# Patient Record
Sex: FEMALE | Employment: UNEMPLOYED | URBAN - METROPOLITAN AREA
[De-identification: names, ages, dates, MRNs, and addresses within clinical notes are randomized per-mention and may not be internally consistent; named-entity substitution may affect disease eponyms.]

---

## 2018-04-05 LAB
AVERAGE GLUCOSE: NORMAL
HBA1C MFR BLD: 5 %

## 2019-08-20 LAB
AVERAGE GLUCOSE: NORMAL
HBA1C MFR BLD: 5 %

## 2020-10-21 RX ORDER — PROPRANOLOL HYDROCHLORIDE 10 MG/1
10 TABLET ORAL 3 TIMES DAILY
COMMUNITY

## 2020-10-21 RX ORDER — PREDNISONE 2.5 MG
2.5 TABLET ORAL DAILY
COMMUNITY

## 2020-10-21 RX ORDER — OMEPRAZOLE 20 MG/1
20 CAPSULE, DELAYED RELEASE ORAL DAILY
COMMUNITY

## 2020-10-21 RX ORDER — DULOXETIN HYDROCHLORIDE 60 MG/1
60 CAPSULE, DELAYED RELEASE ORAL DAILY
COMMUNITY

## 2020-10-21 RX ORDER — ZOLPIDEM TARTRATE 10 MG/1
TABLET ORAL NIGHTLY PRN
COMMUNITY

## 2020-10-21 RX ORDER — MELOXICAM 15 MG/1
15 TABLET ORAL DAILY
COMMUNITY

## 2022-01-01 ENCOUNTER — APPOINTMENT (OUTPATIENT)
Dept: CT IMAGING | Age: 67
DRG: 064 | End: 2022-01-01
Attending: INTERNAL MEDICINE
Payer: MEDICARE

## 2022-01-01 ENCOUNTER — HOSPITAL ENCOUNTER (INPATIENT)
Age: 67
LOS: 5 days | DRG: 064 | End: 2022-11-30
Attending: INTERNAL MEDICINE | Admitting: FAMILY MEDICINE
Payer: MEDICARE

## 2022-01-01 ENCOUNTER — APPOINTMENT (OUTPATIENT)
Dept: GENERAL RADIOLOGY | Age: 67
DRG: 064 | End: 2022-01-01
Attending: INTERNAL MEDICINE
Payer: MEDICARE

## 2022-01-01 VITALS
TEMPERATURE: 103.1 F | WEIGHT: 135.8 LBS | OXYGEN SATURATION: 72 % | BODY MASS INDEX: 24.06 KG/M2 | HEIGHT: 63 IN | HEART RATE: 118 BPM | RESPIRATION RATE: 24 BRPM | SYSTOLIC BLOOD PRESSURE: 94 MMHG | DIASTOLIC BLOOD PRESSURE: 39 MMHG

## 2022-01-01 DIAGNOSIS — S06.339S: Primary | ICD-10-CM

## 2022-01-01 LAB
AADO2: 83.8 MMHG
ACANTHOCYTES: ABNORMAL
ALBUMIN SERPL-MCNC: 3.8 G/DL (ref 3.5–5.2)
ALBUMIN SERPL-MCNC: 3.9 G/DL (ref 3.5–5.2)
ALP BLD-CCNC: 107 U/L (ref 35–104)
ALP BLD-CCNC: 109 U/L (ref 35–104)
ALT SERPL-CCNC: 18 U/L (ref 0–32)
ALT SERPL-CCNC: 19 U/L (ref 0–32)
ANGLE (CLOT STRENGTH): 76 DEGREE (ref 59–74)
ANION GAP SERPL CALCULATED.3IONS-SCNC: 13 MMOL/L (ref 7–16)
ANION GAP SERPL CALCULATED.3IONS-SCNC: 16 MMOL/L (ref 7–16)
ANISOCYTOSIS: ABNORMAL
AST SERPL-CCNC: 22 U/L (ref 0–31)
AST SERPL-CCNC: 25 U/L (ref 0–31)
B.E.: -0.9 MMOL/L (ref -3–3)
BASOPHILS ABSOLUTE: 0 E9/L (ref 0–0.2)
BASOPHILS RELATIVE PERCENT: 0.1 % (ref 0–2)
BILIRUB SERPL-MCNC: 0.4 MG/DL (ref 0–1.2)
BILIRUB SERPL-MCNC: 0.5 MG/DL (ref 0–1.2)
BUN BLDV-MCNC: 13 MG/DL (ref 6–23)
BUN BLDV-MCNC: 15 MG/DL (ref 6–23)
CALCIUM IONIZED: 1.17 MMOL/L (ref 1.15–1.33)
CALCIUM IONIZED: 1.18 MMOL/L (ref 1.15–1.33)
CALCIUM SERPL-MCNC: 8.4 MG/DL (ref 8.6–10.2)
CALCIUM SERPL-MCNC: 8.9 MG/DL (ref 8.6–10.2)
CHLORIDE BLD-SCNC: 102 MMOL/L (ref 98–107)
CHLORIDE BLD-SCNC: 103 MMOL/L (ref 98–107)
CO2: 18 MMOL/L (ref 22–29)
CO2: 22 MMOL/L (ref 22–29)
COHB: 0.6 % (ref 0–1.5)
CREAT SERPL-MCNC: 0.5 MG/DL (ref 0.5–1)
CREAT SERPL-MCNC: 0.5 MG/DL (ref 0.5–1)
CRITICAL: ABNORMAL
DATE ANALYZED: ABNORMAL
DATE OF COLLECTION: ABNORMAL
EOSINOPHILS ABSOLUTE: 0 E9/L (ref 0.05–0.5)
EOSINOPHILS RELATIVE PERCENT: 0 % (ref 0–6)
EPL-TEG: 0.6 % (ref 0–15)
FIO2: 35 %
G-TEG: 10.1 K D/SC (ref 4.5–11)
GFR SERPL CREATININE-BSD FRML MDRD: >60 ML/MIN/1.73
GFR SERPL CREATININE-BSD FRML MDRD: >60 ML/MIN/1.73
GLUCOSE BLD-MCNC: 162 MG/DL (ref 74–99)
GLUCOSE BLD-MCNC: 188 MG/DL (ref 74–99)
HBA1C MFR BLD: 4.8 % (ref 4–5.6)
HCO3: 20.2 MMOL/L (ref 22–26)
HCT VFR BLD CALC: 32.4 % (ref 34–48)
HCT VFR BLD CALC: 33.5 % (ref 34–48)
HEMOGLOBIN: 10.7 G/DL (ref 11.5–15.5)
HEMOGLOBIN: 10.9 G/DL (ref 11.5–15.5)
HHB: 1.3 % (ref 0–5)
HYPOCHROMIA: ABNORMAL
K (CLOTTING TIME): 1.1 MIN (ref 1–3)
LAB: ABNORMAL
LY30 (FIBRINOLYSIS): 0.6 % (ref 0–8)
LYMPHOCYTES ABSOLUTE: 0.97 E9/L (ref 1.5–4)
LYMPHOCYTES RELATIVE PERCENT: 6.9 % (ref 20–42)
Lab: ABNORMAL
MA (MAX AMPLITUDE): 66.9 MM (ref 50–70)
MAGNESIUM: 1.7 MG/DL (ref 1.6–2.6)
MAGNESIUM: 1.8 MG/DL (ref 1.6–2.6)
MCH RBC QN AUTO: 22.4 PG (ref 26–35)
MCH RBC QN AUTO: 22.6 PG (ref 26–35)
MCHC RBC AUTO-ENTMCNC: 32.5 % (ref 32–34.5)
MCHC RBC AUTO-ENTMCNC: 33 % (ref 32–34.5)
MCV RBC AUTO: 68.5 FL (ref 80–99.9)
MCV RBC AUTO: 68.9 FL (ref 80–99.9)
METHB: 0.4 % (ref 0–1.5)
MODE: AC
MONOCYTES ABSOLUTE: 0.97 E9/L (ref 0.1–0.95)
MONOCYTES RELATIVE PERCENT: 7 % (ref 2–12)
NEUTROPHILS ABSOLUTE: 11.95 E9/L (ref 1.8–7.3)
NEUTROPHILS RELATIVE PERCENT: 86.1 % (ref 43–80)
O2 SATURATION: 98.7 % (ref 92–98.5)
O2HB: 97.7 % (ref 94–97)
OPERATOR ID: 789
OSMOLALITY URINE: 434 MOSM/KG (ref 300–900)
OVALOCYTES: ABNORMAL
PATIENT TEMP: 37 C
PCO2: 23.8 MMHG (ref 35–45)
PDW BLD-RTO: 21.9 FL (ref 11.5–15)
PDW BLD-RTO: 22.3 FL (ref 11.5–15)
PEEP/CPAP: 5 CMH2O
PFO2: 3.69 MMHG/%
PH BLOOD GAS: 7.55 (ref 7.35–7.45)
PHOSPHORUS: 2.2 MG/DL (ref 2.5–4.5)
PHOSPHORUS: 2.4 MG/DL (ref 2.5–4.5)
PLATELET # BLD: 253 E9/L (ref 130–450)
PLATELET # BLD: 268 E9/L (ref 130–450)
PMV BLD AUTO: 8.6 FL (ref 7–12)
PMV BLD AUTO: 9.4 FL (ref 7–12)
PO2: 129.3 MMHG (ref 75–100)
POIKILOCYTES: ABNORMAL
POLYCHROMASIA: ABNORMAL
POTASSIUM SERPL-SCNC: 3.3 MMOL/L (ref 3.5–5)
POTASSIUM SERPL-SCNC: 3.6 MMOL/L (ref 3.5–5)
PROCALCITONIN: 0.61 NG/ML (ref 0–0.08)
R (REACTION TIME): 2.5 MIN (ref 5–10)
RBC # BLD: 4.73 E12/L (ref 3.5–5.5)
RBC # BLD: 4.86 E12/L (ref 3.5–5.5)
RI(T): 0.65
RR MECHANICAL: 14 B/MIN
SODIUM BLD-SCNC: 137 MMOL/L (ref 132–146)
SODIUM BLD-SCNC: 137 MMOL/L (ref 132–146)
SODIUM BLD-SCNC: 140 MMOL/L (ref 132–146)
SOURCE, BLOOD GAS: ABNORMAL
TARGET CELLS: ABNORMAL
TEAR DROP CELLS: ABNORMAL
THB: 11.5 G/DL (ref 11.5–16.5)
TIME ANALYZED: 416
TOTAL PROTEIN: 6.5 G/DL (ref 6.4–8.3)
TOTAL PROTEIN: 6.8 G/DL (ref 6.4–8.3)
VT MECHANICAL: 380 ML
WBC # BLD: 13.9 E9/L (ref 4.5–11.5)
WBC # BLD: 14.9 E9/L (ref 4.5–11.5)

## 2022-01-01 PROCEDURE — 2500000003 HC RX 250 WO HCPCS: Performed by: NURSE PRACTITIONER

## 2022-01-01 PROCEDURE — 6360000002 HC RX W HCPCS: Performed by: NURSE PRACTITIONER

## 2022-01-01 PROCEDURE — 02HV33Z INSERTION OF INFUSION DEVICE INTO SUPERIOR VENA CAVA, PERCUTANEOUS APPROACH: ICD-10-PCS | Performed by: STUDENT IN AN ORGANIZED HEALTH CARE EDUCATION/TRAINING PROGRAM

## 2022-01-01 PROCEDURE — 85025 COMPLETE CBC W/AUTO DIFF WBC: CPT

## 2022-01-01 PROCEDURE — 99222 1ST HOSP IP/OBS MODERATE 55: CPT | Performed by: PSYCHIATRY & NEUROLOGY

## 2022-01-01 PROCEDURE — 99222 1ST HOSP IP/OBS MODERATE 55: CPT | Performed by: NURSE PRACTITIONER

## 2022-01-01 PROCEDURE — 6360000004 HC RX CONTRAST MEDICATION: Performed by: RADIOLOGY

## 2022-01-01 PROCEDURE — 82330 ASSAY OF CALCIUM: CPT

## 2022-01-01 PROCEDURE — 94640 AIRWAY INHALATION TREATMENT: CPT

## 2022-01-01 PROCEDURE — 6370000000 HC RX 637 (ALT 250 FOR IP): Performed by: FAMILY MEDICINE

## 2022-01-01 PROCEDURE — 2580000003 HC RX 258: Performed by: STUDENT IN AN ORGANIZED HEALTH CARE EDUCATION/TRAINING PROGRAM

## 2022-01-01 PROCEDURE — 94003 VENT MGMT INPAT SUBQ DAY: CPT

## 2022-01-01 PROCEDURE — 2500000003 HC RX 250 WO HCPCS: Performed by: STUDENT IN AN ORGANIZED HEALTH CARE EDUCATION/TRAINING PROGRAM

## 2022-01-01 PROCEDURE — 80053 COMPREHEN METABOLIC PANEL: CPT

## 2022-01-01 PROCEDURE — 83735 ASSAY OF MAGNESIUM: CPT

## 2022-01-01 PROCEDURE — 1200000000 HC SEMI PRIVATE

## 2022-01-01 PROCEDURE — 2580000003 HC RX 258: Performed by: NURSE PRACTITIONER

## 2022-01-01 PROCEDURE — 5A1945Z RESPIRATORY VENTILATION, 24-96 CONSECUTIVE HOURS: ICD-10-PCS | Performed by: INTERNAL MEDICINE

## 2022-01-01 PROCEDURE — 71045 X-RAY EXAM CHEST 1 VIEW: CPT

## 2022-01-01 PROCEDURE — 37799 UNLISTED PX VASCULAR SURGERY: CPT

## 2022-01-01 PROCEDURE — 36556 INSERT NON-TUNNEL CV CATH: CPT

## 2022-01-01 PROCEDURE — 2000000000 HC ICU R&B

## 2022-01-01 PROCEDURE — 85576 BLOOD PLATELET AGGREGATION: CPT

## 2022-01-01 PROCEDURE — A4216 STERILE WATER/SALINE, 10 ML: HCPCS | Performed by: STUDENT IN AN ORGANIZED HEALTH CARE EDUCATION/TRAINING PROGRAM

## 2022-01-01 PROCEDURE — 6370000000 HC RX 637 (ALT 250 FOR IP): Performed by: NURSE PRACTITIONER

## 2022-01-01 PROCEDURE — 85027 COMPLETE CBC AUTOMATED: CPT

## 2022-01-01 PROCEDURE — 6360000002 HC RX W HCPCS: Performed by: STUDENT IN AN ORGANIZED HEALTH CARE EDUCATION/TRAINING PROGRAM

## 2022-01-01 PROCEDURE — 85384 FIBRINOGEN ACTIVITY: CPT

## 2022-01-01 PROCEDURE — 82805 BLOOD GASES W/O2 SATURATION: CPT

## 2022-01-01 PROCEDURE — 70496 CT ANGIOGRAPHY HEAD: CPT

## 2022-01-01 PROCEDURE — 70450 CT HEAD/BRAIN W/O DYE: CPT

## 2022-01-01 PROCEDURE — 83036 HEMOGLOBIN GLYCOSYLATED A1C: CPT

## 2022-01-01 PROCEDURE — 99233 SBSQ HOSP IP/OBS HIGH 50: CPT | Performed by: SURGERY

## 2022-01-01 PROCEDURE — 6360000002 HC RX W HCPCS

## 2022-01-01 PROCEDURE — 36592 COLLECT BLOOD FROM PICC: CPT

## 2022-01-01 PROCEDURE — 84295 ASSAY OF SERUM SODIUM: CPT

## 2022-01-01 PROCEDURE — 36620 INSERTION CATHETER ARTERY: CPT

## 2022-01-01 PROCEDURE — 85347 COAGULATION TIME ACTIVATED: CPT

## 2022-01-01 PROCEDURE — 99232 SBSQ HOSP IP/OBS MODERATE 35: CPT | Performed by: NURSE PRACTITIONER

## 2022-01-01 PROCEDURE — 94002 VENT MGMT INPAT INIT DAY: CPT

## 2022-01-01 PROCEDURE — 36415 COLL VENOUS BLD VENIPUNCTURE: CPT

## 2022-01-01 PROCEDURE — 6370000000 HC RX 637 (ALT 250 FOR IP): Performed by: STUDENT IN AN ORGANIZED HEALTH CARE EDUCATION/TRAINING PROGRAM

## 2022-01-01 PROCEDURE — 83935 ASSAY OF URINE OSMOLALITY: CPT

## 2022-01-01 PROCEDURE — 6360000002 HC RX W HCPCS: Performed by: FAMILY MEDICINE

## 2022-01-01 PROCEDURE — 84145 PROCALCITONIN (PCT): CPT

## 2022-01-01 PROCEDURE — 84100 ASSAY OF PHOSPHORUS: CPT

## 2022-01-01 RX ORDER — MINERAL OIL AND WHITE PETROLATUM 150; 830 MG/G; MG/G
OINTMENT OPHTHALMIC EVERY 4 HOURS
Status: DISCONTINUED | OUTPATIENT
Start: 2022-01-01 | End: 2022-01-01

## 2022-01-01 RX ORDER — MIDAZOLAM HYDROCHLORIDE 2 MG/2ML
1 INJECTION, SOLUTION INTRAMUSCULAR; INTRAVENOUS ONCE
Status: COMPLETED | OUTPATIENT
Start: 2022-01-01 | End: 2022-01-01

## 2022-01-01 RX ORDER — ONDANSETRON 2 MG/ML
4 INJECTION INTRAMUSCULAR; INTRAVENOUS EVERY 6 HOURS PRN
Status: DISCONTINUED | OUTPATIENT
Start: 2022-01-01 | End: 2022-01-01 | Stop reason: HOSPADM

## 2022-01-01 RX ORDER — HYDRALAZINE HYDROCHLORIDE 20 MG/ML
10 INJECTION INTRAMUSCULAR; INTRAVENOUS EVERY 30 MIN PRN
Status: DISCONTINUED | OUTPATIENT
Start: 2022-01-01 | End: 2022-01-01

## 2022-01-01 RX ORDER — MIDAZOLAM HYDROCHLORIDE 2 MG/2ML
2 INJECTION, SOLUTION INTRAMUSCULAR; INTRAVENOUS
Status: DISCONTINUED | OUTPATIENT
Start: 2022-01-01 | End: 2022-01-01 | Stop reason: HOSPADM

## 2022-01-01 RX ORDER — LABETALOL HYDROCHLORIDE 5 MG/ML
10 INJECTION, SOLUTION INTRAVENOUS
Status: DISCONTINUED | OUTPATIENT
Start: 2022-01-01 | End: 2022-01-01

## 2022-01-01 RX ORDER — SODIUM CHLORIDE 0.9 % (FLUSH) 0.9 %
5-40 SYRINGE (ML) INJECTION 2 TIMES DAILY
Status: DISCONTINUED | OUTPATIENT
Start: 2022-01-01 | End: 2022-01-01

## 2022-01-01 RX ORDER — MAGNESIUM SULFATE IN WATER 40 MG/ML
2000 INJECTION, SOLUTION INTRAVENOUS ONCE
Status: COMPLETED | OUTPATIENT
Start: 2022-01-01 | End: 2022-01-01

## 2022-01-01 RX ORDER — IPRATROPIUM BROMIDE AND ALBUTEROL SULFATE 2.5; .5 MG/3ML; MG/3ML
1 SOLUTION RESPIRATORY (INHALATION)
Status: DISCONTINUED | OUTPATIENT
Start: 2022-01-01 | End: 2022-01-01

## 2022-01-01 RX ORDER — MORPHINE SULFATE 2 MG/ML
2 INJECTION, SOLUTION INTRAMUSCULAR; INTRAVENOUS
Status: DISCONTINUED | OUTPATIENT
Start: 2022-01-01 | End: 2022-01-01 | Stop reason: HOSPADM

## 2022-01-01 RX ORDER — LORAZEPAM 2 MG/ML
0.5 INJECTION INTRAMUSCULAR
Status: DISCONTINUED | OUTPATIENT
Start: 2022-01-01 | End: 2022-01-01 | Stop reason: HOSPADM

## 2022-01-01 RX ORDER — PROPRANOLOL HYDROCHLORIDE 10 MG/1
10 TABLET ORAL 3 TIMES DAILY
Status: DISCONTINUED | OUTPATIENT
Start: 2022-01-01 | End: 2022-01-01

## 2022-01-01 RX ORDER — MIDAZOLAM HYDROCHLORIDE 1 MG/ML
INJECTION INTRAMUSCULAR; INTRAVENOUS
Status: COMPLETED
Start: 2022-01-01 | End: 2022-01-01

## 2022-01-01 RX ORDER — ONDANSETRON 4 MG/1
4 TABLET, ORALLY DISINTEGRATING ORAL EVERY 8 HOURS PRN
Status: DISCONTINUED | OUTPATIENT
Start: 2022-01-01 | End: 2022-01-01 | Stop reason: HOSPADM

## 2022-01-01 RX ORDER — LEVETIRACETAM 10 MG/ML
1000 INJECTION INTRAVASCULAR ONCE
Status: DISCONTINUED | OUTPATIENT
Start: 2022-01-01 | End: 2022-01-01

## 2022-01-01 RX ORDER — AMLODIPINE BESYLATE 5 MG/1
5 TABLET ORAL DAILY
Status: DISCONTINUED | OUTPATIENT
Start: 2022-01-01 | End: 2022-01-01

## 2022-01-01 RX ORDER — SODIUM CHLORIDE 0.9 % (FLUSH) 0.9 %
5-40 SYRINGE (ML) INJECTION PRN
Status: DISCONTINUED | OUTPATIENT
Start: 2022-01-01 | End: 2022-01-01 | Stop reason: HOSPADM

## 2022-01-01 RX ORDER — CHLORHEXIDINE GLUCONATE 0.12 MG/ML
15 RINSE ORAL 2 TIMES DAILY
Status: DISCONTINUED | OUTPATIENT
Start: 2022-01-01 | End: 2022-01-01

## 2022-01-01 RX ORDER — MORPHINE SULFATE 4 MG/ML
4 INJECTION, SOLUTION INTRAMUSCULAR; INTRAVENOUS
Status: DISCONTINUED | OUTPATIENT
Start: 2022-01-01 | End: 2022-01-01 | Stop reason: HOSPADM

## 2022-01-01 RX ORDER — ACETAMINOPHEN 325 MG/1
650 TABLET ORAL EVERY 4 HOURS PRN
Status: DISCONTINUED | OUTPATIENT
Start: 2022-01-01 | End: 2022-01-01

## 2022-01-01 RX ORDER — POTASSIUM BICARBONATE 25 MEQ/1
25 TABLET, EFFERVESCENT ORAL ONCE
Status: DISCONTINUED | OUTPATIENT
Start: 2022-01-01 | End: 2022-01-01

## 2022-01-01 RX ORDER — 3% SODIUM CHLORIDE 3 G/100ML
25 INJECTION, SOLUTION INTRAVENOUS CONTINUOUS
Status: DISCONTINUED | OUTPATIENT
Start: 2022-01-01 | End: 2022-01-01

## 2022-01-01 RX ORDER — GLYCOPYRROLATE 0.2 MG/ML
0.2 INJECTION INTRAMUSCULAR; INTRAVENOUS EVERY 4 HOURS PRN
Status: DISCONTINUED | OUTPATIENT
Start: 2022-01-01 | End: 2022-01-01 | Stop reason: HOSPADM

## 2022-01-01 RX ORDER — PROPOFOL 10 MG/ML
5-50 INJECTION, EMULSION INTRAVENOUS CONTINUOUS
Status: DISCONTINUED | OUTPATIENT
Start: 2022-01-01 | End: 2022-01-01

## 2022-01-01 RX ORDER — DULOXETIN HYDROCHLORIDE 60 MG/1
60 CAPSULE, DELAYED RELEASE ORAL DAILY
Status: DISCONTINUED | OUTPATIENT
Start: 2022-01-01 | End: 2022-01-01

## 2022-01-01 RX ORDER — SODIUM CHLORIDE 0.9 % (FLUSH) 0.9 %
10 SYRINGE (ML) INJECTION ONCE
Status: DISCONTINUED | OUTPATIENT
Start: 2022-01-01 | End: 2022-01-01

## 2022-01-01 RX ORDER — POLYETHYLENE GLYCOL 3350 17 G/17G
17 POWDER, FOR SOLUTION ORAL DAILY
Status: DISCONTINUED | OUTPATIENT
Start: 2022-01-01 | End: 2022-01-01

## 2022-01-01 RX ORDER — POLYVINYL ALCOHOL 14 MG/ML
1 SOLUTION/ DROPS OPHTHALMIC EVERY 4 HOURS
Status: DISCONTINUED | OUTPATIENT
Start: 2022-01-01 | End: 2022-01-01

## 2022-01-01 RX ORDER — LABETALOL HYDROCHLORIDE 5 MG/ML
10 INJECTION, SOLUTION INTRAVENOUS EVERY 10 MIN PRN
Status: DISCONTINUED | OUTPATIENT
Start: 2022-01-01 | End: 2022-01-01

## 2022-01-01 RX ORDER — LEVETIRACETAM 5 MG/ML
500 INJECTION INTRAVASCULAR EVERY 12 HOURS
Status: DISCONTINUED | OUTPATIENT
Start: 2022-01-01 | End: 2022-01-01

## 2022-01-01 RX ADMIN — LORAZEPAM 0.5 MG: 2 INJECTION INTRAMUSCULAR; INTRAVENOUS at 12:17

## 2022-01-01 RX ADMIN — POLYVINYL ALCOHOL 1 DROP: 14 SOLUTION/ DROPS OPHTHALMIC at 01:22

## 2022-01-01 RX ADMIN — MORPHINE SULFATE 4 MG: 4 INJECTION, SOLUTION INTRAMUSCULAR; INTRAVENOUS at 17:30

## 2022-01-01 RX ADMIN — GLYCOPYRROLATE 0.2 MG: 0.2 INJECTION, SOLUTION INTRAMUSCULAR; INTRAVENOUS at 10:34

## 2022-01-01 RX ADMIN — CHLORHEXIDINE GLUCONATE 15 ML: 1.2 RINSE BUCCAL at 01:23

## 2022-01-01 RX ADMIN — FAMOTIDINE 20 MG: 10 INJECTION, SOLUTION INTRAVENOUS at 08:21

## 2022-01-01 RX ADMIN — MINERAL OIL, WHITE PETROLATUM: .03; .94 OINTMENT OPHTHALMIC at 01:26

## 2022-01-01 RX ADMIN — IPRATROPIUM BROMIDE AND ALBUTEROL SULFATE 1 AMPULE: 2.5; .5 SOLUTION RESPIRATORY (INHALATION) at 12:22

## 2022-01-01 RX ADMIN — DULOXETINE HYDROCHLORIDE 60 MG: 60 CAPSULE, DELAYED RELEASE ORAL at 08:18

## 2022-01-01 RX ADMIN — MIDAZOLAM HYDROCHLORIDE 1 MG: 2 INJECTION, SOLUTION INTRAMUSCULAR; INTRAVENOUS at 01:26

## 2022-01-01 RX ADMIN — MIDAZOLAM 2 MG: 1 INJECTION INTRAMUSCULAR; INTRAVENOUS at 20:30

## 2022-01-01 RX ADMIN — MORPHINE SULFATE 4 MG: 4 INJECTION, SOLUTION INTRAMUSCULAR; INTRAVENOUS at 14:29

## 2022-01-01 RX ADMIN — MIDAZOLAM 1 MG: 1 INJECTION INTRAMUSCULAR; INTRAVENOUS at 01:26

## 2022-01-01 RX ADMIN — LORAZEPAM 0.5 MG: 2 INJECTION INTRAMUSCULAR; INTRAVENOUS at 16:28

## 2022-01-01 RX ADMIN — POTASSIUM BICARBONATE 40 MEQ: 782 TABLET, EFFERVESCENT ORAL at 08:13

## 2022-01-01 RX ADMIN — LORAZEPAM 0.5 MG: 2 INJECTION INTRAMUSCULAR; INTRAVENOUS at 14:26

## 2022-01-01 RX ADMIN — ACETAMINOPHEN 650 MG: 325 TABLET ORAL at 12:50

## 2022-01-01 RX ADMIN — POLYVINYL ALCOHOL 1 DROP: 14 SOLUTION/ DROPS OPHTHALMIC at 05:22

## 2022-01-01 RX ADMIN — SODIUM CHLORIDE, PRESERVATIVE FREE 10 ML: 5 INJECTION INTRAVENOUS at 14:03

## 2022-01-01 RX ADMIN — POLYVINYL ALCOHOL 1 DROP: 14 SOLUTION/ DROPS OPHTHALMIC at 08:30

## 2022-01-01 RX ADMIN — MORPHINE SULFATE 4 MG: 4 INJECTION, SOLUTION INTRAMUSCULAR; INTRAVENOUS at 16:43

## 2022-01-01 RX ADMIN — CHLORHEXIDINE GLUCONATE 15 ML: 1.2 RINSE BUCCAL at 08:10

## 2022-01-01 RX ADMIN — MIDAZOLAM 2 MG: 1 INJECTION INTRAMUSCULAR; INTRAVENOUS at 14:03

## 2022-01-01 RX ADMIN — SODIUM CHLORIDE 7.5 MG/HR: 9 INJECTION, SOLUTION INTRAVENOUS at 22:04

## 2022-01-01 RX ADMIN — MORPHINE SULFATE 9 MG/HR: 10 INJECTION INTRAVENOUS at 05:36

## 2022-01-01 RX ADMIN — AMPICILLIN SODIUM AND SULBACTAM SODIUM 3000 MG: 2; 1 INJECTION, POWDER, FOR SOLUTION INTRAMUSCULAR; INTRAVENOUS at 06:37

## 2022-01-01 RX ADMIN — ACETAMINOPHEN 650 MG: 325 TABLET ORAL at 08:14

## 2022-01-01 RX ADMIN — POLYETHYLENE GLYCOL 3350 17 G: 17 POWDER, FOR SOLUTION ORAL at 08:14

## 2022-01-01 RX ADMIN — PROPOFOL 40 MCG/KG/MIN: 10 INJECTION, EMULSION INTRAVENOUS at 22:02

## 2022-01-01 RX ADMIN — MORPHINE SULFATE 1 MG/HR: 10 INJECTION INTRAVENOUS at 15:29

## 2022-01-01 RX ADMIN — AMLODIPINE BESYLATE 5 MG: 5 TABLET ORAL at 08:18

## 2022-01-01 RX ADMIN — MORPHINE SULFATE 6 MG/HR: 10 INJECTION INTRAVENOUS at 12:25

## 2022-01-01 RX ADMIN — MINERAL OIL, WHITE PETROLATUM: .03; .94 OINTMENT OPHTHALMIC at 09:59

## 2022-01-01 RX ADMIN — IPRATROPIUM BROMIDE AND ALBUTEROL SULFATE 1 AMPULE: 2.5; .5 SOLUTION RESPIRATORY (INHALATION) at 08:13

## 2022-01-01 RX ADMIN — MIDAZOLAM 2 MG: 1 INJECTION INTRAMUSCULAR; INTRAVENOUS at 11:24

## 2022-01-01 RX ADMIN — MORPHINE SULFATE 4 MG: 4 INJECTION, SOLUTION INTRAMUSCULAR; INTRAVENOUS at 15:15

## 2022-01-01 RX ADMIN — PROPRANOLOL HYDROCHLORIDE 10 MG: 10 TABLET ORAL at 12:51

## 2022-01-01 RX ADMIN — MORPHINE SULFATE 8 MG/HR: 10 INJECTION INTRAVENOUS at 02:38

## 2022-01-01 RX ADMIN — LORAZEPAM 0.5 MG: 2 INJECTION INTRAMUSCULAR; INTRAVENOUS at 22:40

## 2022-01-01 RX ADMIN — SODIUM CHLORIDE 25 ML/HR: 3 INJECTION, SOLUTION INTRAVENOUS at 03:42

## 2022-01-01 RX ADMIN — PROPRANOLOL HYDROCHLORIDE 10 MG: 10 TABLET ORAL at 08:19

## 2022-01-01 RX ADMIN — ONDANSETRON 4 MG: 2 INJECTION INTRAMUSCULAR; INTRAVENOUS at 23:11

## 2022-01-01 RX ADMIN — POLYVINYL ALCOHOL 1 DROP: 14 SOLUTION/ DROPS OPHTHALMIC at 11:31

## 2022-01-01 RX ADMIN — MINERAL OIL, WHITE PETROLATUM: .03; .94 OINTMENT OPHTHALMIC at 06:37

## 2022-01-01 RX ADMIN — LEVETIRACETAM 500 MG: 5 INJECTION INTRAVENOUS at 08:25

## 2022-01-01 RX ADMIN — AMPICILLIN SODIUM AND SULBACTAM SODIUM 3000 MG: 2; 1 INJECTION, POWDER, FOR SOLUTION INTRAMUSCULAR; INTRAVENOUS at 01:26

## 2022-01-01 RX ADMIN — SODIUM CHLORIDE, PRESERVATIVE FREE 10 ML: 5 INJECTION INTRAVENOUS at 16:44

## 2022-01-01 RX ADMIN — GLYCOPYRROLATE 0.2 MG: 0.2 INJECTION, SOLUTION INTRAMUSCULAR; INTRAVENOUS at 15:16

## 2022-01-01 RX ADMIN — MORPHINE SULFATE 9 MG/HR: 10 INJECTION INTRAVENOUS at 14:37

## 2022-01-01 RX ADMIN — SODIUM CHLORIDE 11 MG/HR: 9 INJECTION, SOLUTION INTRAVENOUS at 05:14

## 2022-01-01 RX ADMIN — SODIUM CHLORIDE, PRESERVATIVE FREE 10 ML: 5 INJECTION INTRAVENOUS at 15:16

## 2022-01-01 RX ADMIN — MAGNESIUM SULFATE HEPTAHYDRATE 2000 MG: 40 INJECTION, SOLUTION INTRAVENOUS at 08:33

## 2022-01-01 RX ADMIN — SODIUM CHLORIDE, PRESERVATIVE FREE 10 ML: 5 INJECTION INTRAVENOUS at 11:24

## 2022-01-01 RX ADMIN — GLYCOPYRROLATE 0.2 MG: 0.2 INJECTION, SOLUTION INTRAMUSCULAR; INTRAVENOUS at 14:24

## 2022-01-01 RX ADMIN — HYDRALAZINE HYDROCHLORIDE 10 MG: 20 INJECTION INTRAMUSCULAR; INTRAVENOUS at 03:04

## 2022-01-01 RX ADMIN — LORAZEPAM 0.5 MG: 2 INJECTION INTRAMUSCULAR; INTRAVENOUS at 17:30

## 2022-01-01 RX ADMIN — SODIUM CHLORIDE 10 MG/HR: 9 INJECTION, SOLUTION INTRAVENOUS at 10:21

## 2022-01-01 RX ADMIN — SODIUM CHLORIDE, PRESERVATIVE FREE 10 ML: 5 INJECTION INTRAVENOUS at 17:30

## 2022-01-01 RX ADMIN — GLYCOPYRROLATE 0.2 MG: 0.2 INJECTION, SOLUTION INTRAMUSCULAR; INTRAVENOUS at 05:47

## 2022-01-01 RX ADMIN — MIDAZOLAM 2 MG: 1 INJECTION INTRAMUSCULAR; INTRAVENOUS at 05:47

## 2022-01-01 RX ADMIN — GLYCOPYRROLATE 0.2 MG: 0.2 INJECTION, SOLUTION INTRAMUSCULAR; INTRAVENOUS at 20:30

## 2022-01-01 RX ADMIN — LORAZEPAM 0.5 MG: 2 INJECTION INTRAMUSCULAR; INTRAVENOUS at 12:28

## 2022-01-01 RX ADMIN — LORAZEPAM 0.5 MG: 2 INJECTION INTRAMUSCULAR; INTRAVENOUS at 15:16

## 2022-01-01 RX ADMIN — MORPHINE SULFATE 8 MG/HR: 10 INJECTION INTRAVENOUS at 17:53

## 2022-01-01 RX ADMIN — AMPICILLIN SODIUM AND SULBACTAM SODIUM 3000 MG: 2; 1 INJECTION, POWDER, FOR SOLUTION INTRAMUSCULAR; INTRAVENOUS at 11:27

## 2022-01-01 RX ADMIN — IOPAMIDOL 70 ML: 755 INJECTION, SOLUTION INTRAVENOUS at 06:13

## 2022-01-01 RX ADMIN — PROPRANOLOL HYDROCHLORIDE 10 MG: 10 TABLET ORAL at 01:27

## 2022-01-01 RX ADMIN — FAMOTIDINE 20 MG: 10 INJECTION, SOLUTION INTRAVENOUS at 01:23

## 2022-01-01 RX ADMIN — POTASSIUM PHOSPHATE, MONOBASIC AND POTASSIUM PHOSPHATE, DIBASIC 30 MMOL: 224; 236 INJECTION, SOLUTION, CONCENTRATE INTRAVENOUS at 08:35

## 2022-01-01 RX ADMIN — SODIUM CHLORIDE, PRESERVATIVE FREE 10 ML: 5 INJECTION INTRAVENOUS at 08:19

## 2022-01-01 ASSESSMENT — PULMONARY FUNCTION TESTS
PIF_VALUE: 18
PIF_VALUE: 16
PIF_VALUE: 18
PIF_VALUE: 19
PIF_VALUE: 28
PIF_VALUE: 20
PIF_VALUE: 20
PIF_VALUE: 18
PIF_VALUE: 21
PIF_VALUE: 17
PIF_VALUE: 20
PIF_VALUE: 19
PIF_VALUE: 19
PIF_VALUE: 17
PIF_VALUE: 16
PIF_VALUE: 16
PIF_VALUE: 18
PIF_VALUE: 20
PIF_VALUE: 19
PIF_VALUE: 20
PIF_VALUE: 20
PIF_VALUE: 19
PIF_VALUE: 23
PIF_VALUE: 18
PIF_VALUE: 19
PIF_VALUE: 19
PIF_VALUE: 40
PIF_VALUE: 19
PIF_VALUE: 18
PIF_VALUE: 23
PIF_VALUE: 20
PIF_VALUE: 19
PIF_VALUE: 20
PIF_VALUE: 19
PIF_VALUE: 21
PIF_VALUE: 18
PIF_VALUE: 18
PIF_VALUE: 19
PIF_VALUE: 20
PIF_VALUE: 15
PIF_VALUE: 18
PIF_VALUE: 19

## 2022-01-01 ASSESSMENT — PAIN SCALES - GENERAL
PAINLEVEL_OUTOF10: 1
PAINLEVEL_OUTOF10: 0
PAINLEVEL_OUTOF10: 1
PAINLEVEL_OUTOF10: 0
PAINLEVEL_OUTOF10: 1
PAINLEVEL_OUTOF10: 0
PAINLEVEL_OUTOF10: 1

## 2022-11-25 PROBLEM — S06.339S: Status: ACTIVE | Noted: 2022-01-01

## 2022-11-26 PROBLEM — I62.9 INTRACRANIAL HEMORRHAGE (HCC): Status: ACTIVE | Noted: 2022-01-01

## 2022-11-26 NOTE — PROGRESS NOTES
[facility]  SURGICAL INTENSIVE CARE UNIT (SICU)  ATTENDING PHYSICIAN CRITICAL CARE PROGRESS NOTE     I have examined the patient, reviewed the record,and discussed the case with the APN/  Resident. I have reviewed all relevant labs and imaging data. The following summarizes my clinical findings and independent assessment. Date of admission:  11/25/2022    CC: [facility]     HOSPITAL COURSE:   Noemy Dean is a 69year old female who was found down in her yard this afternoon. Last known well around 1 pm. She presented to [facility] where she underwent CT head which revealed 5 x 7 cm left parietal intraparenchymal measuring 5 x 7 cm with surrounding edema and 8 mm midline shift. There was also a 3-5 mm SDH. The disk with the scan was sent over from [place], but the scans were unable to be viewed. Patient has unequal pupils- right 5 mm and left 3 mm. She is moving her left side spontaneously with minimal movement of her right side. Patient is not on any anticoagulation. New Imaging Reviewed and personally interpreted:    CT head reviewed left parenchymal hemorrhage within the left temporal lobe with 6 mm rightward shift     New Labs reviewed sodium 140, potassium 3.3, creatinine 0.5, glucose 188, Phos 2.2, LFTs essentially normal, white blood cell count 14.9, hemoglobin 10.7, platelet count 202    Vent Settings AC vent respiratory rate 14 tidal volume 380 PEEP 5    Physical Exam  HENT:      Head: Normocephalic. Eyes:      Pupils: Pupils are equal, round, and reactive to light. Cardiovascular:      Rate and Rhythm: Normal rate. Pulmonary:      Effort: Pulmonary effort is normal.   Abdominal:      General: Abdomen is flat. There is no distension. Musculoskeletal:      Cervical back: Neck supple. Skin:     General: Skin is warm.    Neurological:      Comments: Minimal withdrawal on the right purposeful on the left does not follow commands       Assessment   Principal Problem: Intraparenchymal hematoma of brain, unspecified laterality, with loc of unspecified duration, sequela ([address]  Active Problems:    Intracranial hemorrhage ([address]  Resolved Problems:    * No resolved hospital problems.  *      Plan     Code status changed DNR-CC   Comfort measures to be placed     Spring Carrero MD

## 2022-11-26 NOTE — PROGRESS NOTES
11/26/22 1355   NICU Vent Information   Vent Mode (S)  AC/VC   Vt (Set, mL) 380 mL   Vt (Measured) 389 mL   Resp Rate (Set) 16 bmp   Rate Measured 15 br/min   Minute Volume (L/min) 5.42 Liters   Peak Flow 60 L/min   FiO2  35 %   Peak Inspiratory Pressure (cmH2O) 28 cmH2O   I:E Ratio 1:4.40   Sensitivity 3   PEEP/CPAP (cmH2O) 5   Mean Airway Pressure (cmH2O) 7 cmH20   Additional Respiratory Assessments   Heart Rate 65   Resp (!) 31   SpO2 100 %   Vent Alarm Settings   High Pressure (cmH2O) 40 cmH2O   Low Exhaled Vt (ml) 0 mL   Patient placed back on AC settings at this time

## 2022-11-26 NOTE — PROCEDURES
Vonda Mcgee is a 69 y.o. female patient. No diagnosis found. No past medical history on file. Blood pressure (!) 149/68, pulse 72, temperature 98.1 °F (36.7 °C), temperature source Temporal, resp. rate 18, height 5' 3\" (1.6 m), weight 135 lb 12.8 oz (61.6 kg), SpO2 100 %. Central Line    Date/Time: 11/26/2022 1:16 AM  Performed by: Kacie Cabrera MD  Authorized by: Kacie Cabrera MD   Consent: Verbal consent obtained.   Risks and benefits: risks, benefits and alternatives were discussed  Consent given by: power of   Procedure consent: procedure consent matches procedure scheduled  Relevant documents: relevant documents present and verified  Test results: test results available and properly labeled  Site marked: the operative site was marked  Imaging studies: imaging studies available  Required items: required blood products, implants, devices, and special equipment available  Patient identity confirmed: anonymous protocol, patient vented/unresponsive  Indications: vascular access  Anesthesia: see MAR for details    Anesthesia:  Local Anesthetic: lidocaine 1% with epinephrine  Preparation: skin prepped with 2% chlorhexidine  Skin prep agent dried: skin prep agent completely dried prior to procedure  Sterile barriers: all five maximum sterile barriers used - cap, mask, sterile gown, sterile gloves, and large sterile sheet  Hand hygiene: hand hygiene performed prior to central venous catheter insertion  Location details: right internal jugular  Patient position: flat  Catheter type: triple lumen  Catheter size: 7 Fr  Pre-procedure: landmarks identified  Ultrasound guidance: yes  Sterile ultrasound techniques: sterile gel and sterile probe covers were used  Number of attempts: 1  Successful placement: yes  Post-procedure: line sutured and dressing applied  Assessment: blood return through all ports and free fluid flow  Patient tolerance: patient tolerated the procedure well with no immediate complications    Post-operative CXR ordered. Dr. Phi Meza was readily available throughout the entire procedure.      Jewel Wesley MD  11/26/2022

## 2022-11-26 NOTE — H&P
Hospital Medicine History & Physical      PCP: No primary care provider on file. Date of Admission: 11/25/2022    Date of Service: Pt seen/examined on 11/25/2022 and Admitted to Inpatient with expected LOS greater than two midnights due to medical therapy. Chief Complaint:  AMS       History Of Present Illness:    69 y.o. female with past medical history of hypertension GERD PMR  . Patient is a transfer from [place] . Patient is intubated and sedated . She was found down in her yard by her neighbors . It is unclear how long she was down for . Jaja Berman Patient was seen 5-6 days ago due to headache. and CT head and CT angio were negative Son states that he last spoke to patient at 1300 and she was well . CT revealed  5x7 intraparenchymal hematoma in left middle parietal lobe with edema 8mm  midline shift  and large subdural hematoma 3-5 m in thickness . Patient was intubated and pressures were noted to be elevated and she was placed on nicardipine drip . Patient is not on anticoagulation  ECG revealed sinus bradycardia . CBC unremarkable  CMP revealed sodium 133  BUN 23 SCr  0.7 glucose 153  VBG revealed respiratory alkalosis  CXR was negative . Patient was given Keppra as well,. Patient was transferred to [address] [place] [address] for further evaluation     Past Medical History:      Hypertension     Past Surgical History:          Procedure Laterality Date    BREAST BIOPSY Right     negative    HERNIA REPAIR Right     groin    LUMBAR LAMINECTOMY         Medications Prior to Admission:      Prior to Admission medications    Medication Sig Start Date End Date Taking?  Authorizing Provider   Coral Calcium-Magnesium-Vit D [phone] MG-MG-UNIT CAPS Take by mouth    Historical Provider, MD   VITAMIN D PO Take 1,000 Units by mouth    Historical Provider, MD   omeprazole (PRILOSEC) 20 MG delayed release capsule Take 20 mg by mouth daily    Historical Provider, MD   propranolol (INDERAL) 10 MG tablet Take 10 mg by mouth 3 times daily    Historical Provider, MD   zolpidem (AMBIEN) 10 MG tablet Take by mouth nightly as needed for Sleep. Historical Provider, MD   predniSONE (DELTASONE) 2.5 MG tablet Take 2.5 mg by mouth daily    Historical Provider, MD   meloxicam (MOBIC) 15 MG tablet Take 15 mg by mouth daily    Historical Provider, MD   DULoxetine (CYMBALTA) 60 MG extended release capsule Take 60 mg by mouth daily    Historical Provider, MD       Allergies:  Zithromax [azithromycin]    Social History:      The patient currently lives w3ith family     TOBACCO:   reports that she has never smoked. She does not have any smokeless tobacco history on file. ETOH:   has no history on file for alcohol use. Family History:       Reviewed in detail and negative for DM, CAD, Cancer, CVA. Positive as follows:        Problem Relation Age of Onset    Breast Cancer Mother     Other Son         thalassemia       REVIEW OF SYSTEMS:   unable to obtain patient is intubated and sedated . PHYSICAL EXAM:    /66   Pulse 78   Temp 98.1 °F (36.7 °C) (Temporal)   Resp 14   Ht 5' 3\" (1.6 m)   Wt 135 lb 12.8 oz (61.6 kg)   SpO2 100%   BMI 24.06 kg/m²     General appearance:  intubated   HEENT:  Normal cephalic, atraumatic without obvious deformity. Pupils unequal  round, and reactive to light. Extra ocular muscles intact. Conjunctivae/corneas clear. Neck: Supple, No jugular venous distention. Trachea midline. Respiratory:   intubated  Clear to auscultation, bilaterally without Rales/Wheezes/Rhonchi. Cardiovascular:  Regular rate and rhythm with normal S1/S2 without murmurs, rubs or gallops. Abdomen: Soft, non-tender, non-distended with normal bowel sounds. Musculoskeletal:  No clubbing, cyanosis or edema bilaterally. Full range of motion without deformity. Skin: Skin color, texture, turgor normal.  No rashes or lesions.   Neurologic: moves left side spontaneously  small movement of right  withdraws to pain Psychiatric:  unable to assess Labs:     Recent Labs     11/25/22  1712   WBC 8.2   HGB 12.3   HCT 37.8        Recent Labs     11/25/22  1712   *   K 3.3*      CO2 22   BUN 23*   CREATININE 0.7   CALCIUM 9.4     Recent Labs     11/25/22  1712   AST 21   ALT 18   BILITOT 0.5   ALKPHOS 104     Recent Labs     11/25/22  1712   INR 1.07     No results for input(s): Nuria Doe in the last 72 hours. Urinalysis:    No results found for: NITRU, WBCUA, BACTERIA, RBCUA, BLOODU, SPECGRAV, GLUCOSEU        CT HEAD   FINDINGS:  There is a 5 x 7 cm intraparenchymal hematoma in the left middle parietal lobe with      surrounding edema. Mass effect on the ventricles with effacement of the left lateral ventricle and      8 mm midline shift. Large acute subdural hematoma measuring 3-5 mm thickness extending along the left parietal lobe,      under the bilateral frontal lobes, and along the clivus abutting the midbrain and medulla. Sulci are prominent with low attenuation in the periventricular white matter suggesting mild edema. Impression      CT head with contrast:      1.  5 x 7 cm intraparenchymal hematoma in the left middle parietal lobe with surrounding edema. Mass effect on the ventricles with effacement of the left lateral ventricle and 8 mm midline shift. 2.  Large acute subdural hematoma measuring 3-5 mm thickness extending along the left parietal      lobe, under the bilateral frontal lobes, and along the clivus abutting the midbrain and medulla. CXR     Chest, Portable:             1. No active pulmonary disease. 2.  Lines and tubes in place. No pneumothorax.                 ASSESSMENT:    Active Hospital Problems    Diagnosis Date Noted    Intraparenchymal hematoma of brain, unspecified laterality, with loc of unspecified duration, sequela ([facility] [A80.971T] 11/25/2022     Priority: Medium       PLAN:    Intraparenchymal hematoma  as seen on CT head on admission 5x7 in left parietal lobe  with midline shift 8mm and large subdural hematoma . Patient is not on anticoagulation  Admit neuro ICU Tina hernandez , [address] Neurosurgery Neurology  neuro checks   and critical care consult  NPO Repeat CT in am  CTA Head Venous  NS 3 % infusion     Acute respiratory failure ; patient is currently intubated .  Continue to monitor , pulmonary toilet  wean as tolerated   concern for aspiration pneumonia -Unasyn     Hypertension Keep SBP <140  C/w Cardene drip Hydralazine and labetalol PRN     Hypokalemia : 3.3 will repeat     History of PMR                         DVT Prophylaxis: none due to brain bleed   Diet: Diet NPO  Code Status: Full Code    PT/OT Eval Status:     Dispo - pending clinical course        Carlos Nelson MD

## 2022-11-26 NOTE — PLAN OF CARE
Problem: Safety - Medical Restraint  Goal: Remains free of injury from restraints (Restraint for Interference with Medical Device)  Description: INTERVENTIONS:  1. Determine that other, less restrictive measures have been tried or would not be effective before applying the restraint  2. Evaluate the patient's condition at the time of restraint application  3. Inform patient/family regarding the reason for restraint  4. Q2H: Monitor safety, psychosocial status, comfort, nutrition and hydration  Flowsheets (Taken 11/26/2022 0333)  Remains free of injury from restraints (restraint for interference with medical device):   Determine that other, less restrictive measures have been tried or would not be effective before applying the restraint   Inform patient/family regarding the reason for restraint   Every 2 hours: Monitor safety, psychosocial status, comfort, nutrition and hydration  Note: Patient in soft bilateral wrist restraints due to inability to be redirected and attempting to pull at ETT and other supportive lines.

## 2022-11-26 NOTE — PROGRESS NOTES
Family chose [facility] [address] in [facility] ([phone]).  Verna Ramon RN,

## 2022-11-26 NOTE — PROGRESS NOTES
Pt compassionalty extubated, resting comfortably with family at bedside.     Electronically signed by OSCAR Whitley CNP on 11/26/2022 at 4:04 PM

## 2022-11-26 NOTE — PROGRESS NOTES
11/26/22 0817   NICU Vent Information   Skin Assessment Clean, dry, & intact   Ventilator ID [id number]   Vent Type 980   Vent Mode (S)  PS   Additional Respiratory Assessments   Position Semi-Rothman's   Humidification Source Heated wire   Humidification Temp 37.3   Vent Alarm Settings   Low Minute Volume (lpm) 6 L/min   High Minute Volume (lpm) 16 L/min   Low Exhaled Vt (ml) 280 mL   High Exhaled Vt (ml) 800 mL   RR High (bpm) 35 br/min   Apnea (secs) 20 secs   ETT    Placement Date/Time: 11/25/22 2031   Present on Admission/Arrival: Yes  Airway Tube Size: 7.5 mm  Location: Oral  Secured At: 23 cm  Measured From: Lips   Secured At 22 cm   Measured From Lips   ETT Placement Left   Secured By Commercial tube meyer   Site Assessment Dry   Treatment   $Treatment Type $Inhaled Therapy/Meds   Patient placed in PSV per verbal order from [facility]. Patient tolerating well.

## 2022-11-26 NOTE — PROGRESS NOTES
Hospitalist Progress Note      SYNOPSIS: Patient admitted on 2022 (transferred from [facility])  69 y.o. female with past medical history of hypertension   With intraparenchymal hemorrhage left middle parietal lobe  SUBJECTIVE:    Intraparenchymal hemorrhage/5x7 intraparenchymal hematoma in left middle parietal lobe and  subdural hematoma     Patient is on the ventilator  On nicardipine drip and levetiracetam  Current systolic blood pressure reading below 150      Temp (24hrs), Av.7 °F (37.6 °C), Min:98.1 °F (36.7 °C), Max:100.4 °F (38 °C)    DIET: Diet NPO  CODE: Full Code  OBJECTIVE:    BP (!) 141/73   Pulse 84   Temp 100.4 °F (38 °C)   Resp 21   Ht 5' 3\" (1.6 m)   Wt 135 lb 12.8 oz (61.6 kg)   SpO2 100%   BMI 24.06 kg/m²     General appearance: Elderly  Eyes= pupils symmetrical in size shape and appearance. Respiratory: On ventilator no wheezing auscultation upper lobes  Cardiovascular:  Audible S1-S2 murmurs  Abdomen: Without rigidity  Neurologic: Patient appears to be moving her left upper and lower extremities without difficulty    ASSESSMENT:      Intraparenchymal hemorrhage/5x7 intraparenchymal hematoma in left middle parietal lobe and  subdural hematoma   Hypokalemia-likely due to decreased oral intake  Low C02 -on serum chemistry  Mild leukocytosis  Anemia microcytic-etiology unclear as microcytosis present prior to admission      With normal hemoglobin  Mild hyperglycemia-possibly secondary to inflammatory cytokines  Mild alk phos elevation-etiology unclear as yesterday this value was normal range         Clinical significance of this unclear    Currently on ventilator   Chest x-ray does not reveal clear evidence of infiltrate       Hypertension essential by history  History of PMR was documented on intake H&P from this facility-diagnosis not confirmed  History of spinal stenosis-/with neurogenic claudication-documented        Earlier this month  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     PLAN:    Efforts to keep systolic blood pressure below 150 to continue  Currently on nicardipine-which seems to be keeping systolic blood pressure in range  Levetiracetam for seizure prophylaxis  Replace potassium  Check procalcitonin level today if this is normal  would then    Discontinue Unasyn as there does not appear to be evidence of infiltrate on      chest x-ray    DISPOSITION: Currently in ICU setting on a ventilator and requires care to be delivered in ICU setting      Medications:  REVIEWED DAILY    Infusion Medications    sodium chloride 25 mL/hr (11/26/22 0645)    niCARdipene (CARDENE) 50 mg in 250 mL 0.9 % sodium chloride infusion 10 mg/hr (11/26/22 0645)     Scheduled Medications    sodium chloride flush  10 mL IntraVENous Once    magnesium sulfate  2,000 mg IntraVENous Once    potassium phosphate IVPB  30 mmol IntraVENous Once    amLODIPine  5 mg Oral Daily    sodium chloride flush  5-40 mL IntraVENous BID    levETIRAcetam  1,000 mg IntraVENous Once    levETIRAcetam  500 mg IntraVENous Q12H    ipratropium-albuterol  1 ampule Inhalation Q4H WA    DULoxetine  60 mg Oral Daily    propranolol  10 mg Oral TID    chlorhexidine  15 mL Mouth/Throat BID    famotidine (PEPCID) injection  20 mg IntraVENous BID    polyvinyl alcohol  1 drop Both Eyes Q4H    And    artificial tears   Both Eyes Q4H    ampicillin-sulbactam  3,000 mg IntraVENous Q6H    polyethylene glycol  17 g Oral Daily     PRN Meds: sodium chloride flush, acetaminophen, ondansetron **OR** ondansetron, hydrALAZINE, labetalol    Labs:     Recent Labs     11/25/22 1712 11/26/22  0030 11/26/22  0450   WBC 8.2 13.9* 14.9*   HGB 12.3 10.9* 10.7*   HCT 37.8 33.5* 32.4*    268 253       Recent Labs     11/25/22 1712 11/26/22  0030 11/26/22  0450   * 137 137   K 3.3* 3.6 3.3*    102 103   CO2 22 22 18*   BUN 23* 15 13   CREATININE 0.7 0.5 0.5   CALCIUM 9.4 8.9 8.4*   PHOS  --  2.4* 2.2* Recent Labs     11/25/22  1712 11/26/22  0030 11/26/22  0450   PROT 7.9* 6.8 6.5   ALKPHOS 104 109* 107*   ALT 18 19 18   AST 21 25 22   BILITOT 0.5 0.4 0.5       Recent Labs     11/25/22  1712   INR 1.07       No results for input(s): Efe Knowles in the last 72 hours. Chronic labs:    Lab Results   Component Value Date    CHOL 289 (H) 07/23/2021    TRIG 88 07/23/2021    HDL 75 07/23/2021    TSH 4.67 07/23/2021    INR 1.07 11/25/2022    LABA1C 4.8 11/26/2022       Radiology: REVIEWED DAILY    +++++++++++++++++++++++++++++++++++++++++++++++++  DO Marco A Bianchi Physician - [address] [facility], [place]  +++++++++++++++++++++++++++++++++++++++++++++++++  NOTE: This report was transcribed using voice recognition software. Every effort was made to ensure accuracy; however, inadvertent computerized transcription errors may be present.

## 2022-11-26 NOTE — PROGRESS NOTES
Patient arrived to CVICU bed 3820 with [facility] team. Patient arrives on propofol @40cg/kg/min and 7.5mg/hour of cardene. Dr. Juana Lieberman aware of patient's arrival as well as Dr. Viola Jorgensen.

## 2022-11-26 NOTE — PROGRESS NOTES
[facility] notified of families plan to withdraw care, Per Sushila Gary, okay to withdraw and notify of cardiac time of death.  Sandra Bond RN

## 2022-11-26 NOTE — PLAN OF CARE
Problem: Pain  Goal: Verbalizes/displays adequate comfort level or baseline comfort level  Outcome: Progressing  Flowsheets (Taken 11/26/2022 0919)  Verbalizes/displays adequate comfort level or baseline comfort level: Assess pain using appropriate pain scale     Problem: Neurosensory - Adult  Goal: Achieves stable or improved neurological status  11/26/2022 0919 by Gianna Schultz RN  Outcome: Progressing  Flowsheets (Taken 11/26/2022 0919)  Achieves stable or improved neurological status:   Initiate measures to prevent increased intracranial pressure   Maintain blood pressure and fluid volume within ordered parameters to optimize cerebral perfusion and minimize risk of hemorrhage  11/26/2022 0328 by Medina Edmond RN  Flowsheets (Taken 11/26/2022 3433)  Achieves stable or improved neurological status:   Assess for and report changes in neurological status   Initiate measures to prevent increased intracranial pressure   Monitor temperature, glucose, and sodium. Initiate appropriate interventions as ordered  Note: Patient on 3% NS. Routine neuro exams  Goal: Absence of seizures  11/26/2022 0919 by Gianna Scuhltz RN  Outcome: Progressing  Flowsheets (Taken 11/26/2022 0919)  Absence of seizures:   Monitor for seizure activity. If seizure occurs, document type and location of movements and any associated apnea   Administer anticonvulsants as ordered  11/26/2022 0328 by Medina Edmond RN  Outcome: Progressing  Flowsheets (Taken 11/26/2022 0328)  Absence of seizures:   Monitor for seizure activity.   If seizure occurs, document type and location of movements and any associated apnea   Support airway/breathing, administer oxygen as needed     Problem: Respiratory - Adult  Goal: Achieves optimal ventilation and oxygenation  11/26/2022 0919 by Gianna Schultz RN  Outcome: Progressing  Flowsheets (Taken 11/26/2022 0919)  Achieves optimal ventilation and oxygenation: Assess the need for suctioning and aspirate as needed  11/26/2022 0828 by Sierra Palacios RCP  Outcome: Progressing  11/26/2022 0328 by Calista Cabral RN  Outcome: Progressing  Flowsheets (Taken 11/26/2022 0328)  Achieves optimal ventilation and oxygenation:   Assess for changes in respiratory status   Respiratory therapy support as indicated   Assess the need for suctioning and aspirate as needed  Note: Patient remains intubated, concerns for aspiration. Problem: Safety - Medical Restraint  Goal: Remains free of injury from restraints (Restraint for Interference with Medical Device)  Description: INTERVENTIONS:  1. Determine that other, less restrictive measures have been tried or would not be effective before applying the restraint  2. Evaluate the patient's condition at the time of restraint application  3. Inform patient/family regarding the reason for restraint  4.  Q2H: Monitor safety, psychosocial status, comfort, nutrition and hydration  11/26/2022 0919 by Basim Orosco RN  Outcome: Progressing  Flowsheets  Taken 11/26/2022 0919  Remains free of injury from restraints (restraint for interference with medical device):   Evaluate the patient's condition at the time of restraint application   Determine that other, less restrictive measures have been tried or would not be effective before applying the restraint   Every 2 hours: Monitor safety, psychosocial status, comfort, nutrition and hydration   Inform patient/family regarding the reason for restraint  Taken 11/26/2022 0844  Remains free of injury from restraints (restraint for interference with medical device):   Determine that other, less restrictive measures have been tried or would not be effective before applying the restraint   Evaluate the patient's condition at the time of restraint application   Inform patient/family regarding the reason for restraint  11/26/2022 0333 by Calista Cabral RN  Flowsheets (Taken 11/26/2022 0445)  Remains free of injury from restraints (restraint for interference with medical device):   Determine that other, less restrictive measures have been tried or would not be effective before applying the restraint   Inform patient/family regarding the reason for restraint   Every 2 hours: Monitor safety, psychosocial status, comfort, nutrition and hydration  Note: Patient in soft bilateral wrist restraints due to inability to be redirected and attempting to pull at ETT and other supportive lines.

## 2022-11-26 NOTE — CONSULTS
[facility]  Neurology Consult    Date:  11/26/2022  Patient Name:  Fior Luu  YOB: 1955  MRN: [id number]     PCP:  No primary care provider on file. Referring:  Alka Phelps MD      Chief Complaint: ICH    History obtained from: chart, staff    Assessment  Fior Luu is a 69 y.o. female who appears to have aphasia and right sided weakness in setting of left temporal IPH with associated SDH. Plan  On Keppra x 7 days, no seizures reported thus far  Agree with hypertonic therapy  Would expect cerebral edema to peak between days 3-5 post insult        History of Present Illness:  Fior Luu is a 69 y.o. right handed female presenting for evaluation of left temporal intraparenchymal hemorrhage. No family at bedside. Patient admitted after being found down in her yard by neighbors. She was found to have a left temporal IPH with SDH. 3 and 4 mm aneurysms noted at the left and right MCA bifurcations respectively.            Review of Systems:  Unable to obtain due to intubation    Medical History:   Unable to obtain due to intubation     Surgical History:   Past Surgical History:   Procedure Laterality Date    BREAST BIOPSY Right     negative    HERNIA REPAIR Right     groin    LUMBAR LAMINECTOMY          Family History:   Family History   Problem Relation Age of Onset    Breast Cancer Mother     Other Son         thalassemia       Social History:  Social History     Tobacco Use    Smoking status: Never   Substance Use Topics    Drug use: Never        Current Medications:      Current Facility-Administered Medications   Medication Dose Route Frequency Provider Last Rate Last Admin    sodium chloride flush 0.9 % injection 5-40 mL  5-40 mL IntraVENous PRN OSCAR Reese CNP   10 mL at 11/26/22 1730    morphine (PF) injection 2 mg  2 mg IntraVENous Q15 Min PRN OSCAR Whitaker CNP        Or    morphine sulfate (PF) injection 4 mg  4 mg IntraVENous Q15 Min PRN Bree Kierralizzie Panchal - CNP   4 mg at 11/26/22 1730    LORazepam (ATIVAN) injection 0.5 mg  0.5 mg IntraVENous Q15 Min PRN [facility], APRN - CNP   0.5 mg at 11/26/22 1730    glycopyrrolate (ROBINUL) injection 0.2 mg  0.2 mg IntraVENous Q4H PRN lan Hernandez, APRN - CNP   0.2 mg at 11/26/22 1516    midazolam PF (VERSED) injection 2 mg  2 mg IntraVENous Q2H PRN Azul Paling, APRN - CNP   2 mg at 11/26/22 1403    morphine (PF) 100 mg in sodium chloride 0.9 % 100 mL infusion  0.2-10 mg/hr IntraVENous Continuous Azul Paling, APRN - CNP 3 mL/hr at 11/26/22 1755 3 mg/hr at 11/26/22 1755    ondansetron (ZOFRAN-ODT) disintegrating tablet 4 mg  4 mg Oral Q8H PRN Isabella Bishop MD        Or    ondansetron [facility]) injection 4 mg  4 mg IntraVENous Q6H PRN Isabella Bishop MD   4 mg at 11/25/22 2311    levETIRAcetam (KEPPRA) 1000 mg/100 mL IVPB  1,000 mg IntraVENous Once Isabella Bishop MD            Allergies: Allergies   Allergen Reactions    Zithromax [Azithromycin]         Physical Examination  Vitals   Vitals:    11/26/22 1500 11/26/22 1600 11/26/22 1700 11/26/22 1800   BP:       Pulse: 62 75 69 68   Resp: 16 15 14 11   Temp:       TempSrc:       SpO2: 100% 100% 100% 100%   Weight:       Height:            General: Patient intubated, awake and alert  HEENT: Normocephalic, atraumatic  Chest: intubated  Heart: No murmurs appreciated  Extremities/Peripheral vascular: No edema/swelling noted. No cold limbs noted. Neurologic Examination    Mental Status  Alert, unable to assess orientation well. Does not follow commands. Cranial Nerves  II. Visual fields: right homonymous hemianopia  III, IV, VI: Pupils equally round and reactive to light, 3 to 2 mm bilaterally. EOMs: leftward gaze deviation  V.   VII: Facial movements symmetric - right nasolabial fold flattening noted  VIII: looks toward voice  IX,X: Intubated  XI: shoulders appear symmetric  XII:     Motor  Moves LUE and LLE with good strength antigravity.  Moves RLE with large acute parenchymal hemorrhage centered within the left temporal lobe   with localized mass effect, 6 mm rightward midline shift, small layering   intraventricular hemorrhage along with adjacent left frontotemporal subdural   hematoma. Stable caliber and configuration of the ventricles. 2. 4 mm right MCA bifurcation saccular aneurysm, as well as a 3 mm left MCA   bifurcation saccular aneurysm. 3. No apparent blushing within the parenchymal hemorrhage to suggest active   arterial extravasation. 4. Unremarkable CTV. CTA VENOUS HEAD W WO CONTRAST   Final Result   1. No significant change compared to the earlier same day head CT with a   large acute parenchymal hemorrhage centered within the left temporal lobe   with localized mass effect, 6 mm rightward midline shift, small layering   intraventricular hemorrhage along with adjacent left frontotemporal subdural   hematoma. Stable caliber and configuration of the ventricles. 2. 4 mm right MCA bifurcation saccular aneurysm, as well as a 3 mm left MCA   bifurcation saccular aneurysm. 3. No apparent blushing within the parenchymal hemorrhage to suggest active   arterial extravasation. 4. Unremarkable CTV. XR CHEST PORTABLE   Final Result   Endotracheal tube, gastric tube, esophageal probe, and right jugular central   venous catheter appear in good position. No pneumothorax or effusion. CT HEAD WO CONTRAST   Final Result   Addendum (preliminary) 1 of 1   ADDENDUM:   I reviewed the results with Dr. Socorro Luna via telephone. Final   1. 6.5 X 5.5 x 3.0 cm parenchymal hemorrhage centered on the superior left   temporal lobe with small left frontal overlying subdural hematoma measuring   up to 6.1 mm in thickness. Mass effect results in 8 mm of left to right   midline shift at the foramen of [place]. 2. No prior comparisons available on this database.    The findings were sent to the Radiology Results Communication Center at 12:28

## 2022-11-26 NOTE — CONSULTS
SURGICAL INTENSIVE CARE  CONSULT NOTE    Date of admission:  11/25/2022  Reason for ICU transfer:  ICH/SDH    SUBJECTIVE:  Patient intubated off of sedation. Moving left side spontaneously and withdrawing to pain. R side minimally responsive to pain. Pupils unequal- right larger than left. HOSPITAL COURSE:  Noemy Dean is a 69year old female who was found down in her yard this afternoon. Last known well around 1 pm. [facility] which revealed 5 x 7 cm left parietal intraparenchymal measuring 5 x 7 cm with surrounding edema and 8 mm midline shift. There was also a 3-5 mm SDH. The disk with the scan was sent over from [place], but the scans were unable to be viewed. Patient has unequal pupils- right 5 mm and left 3 mm. She is moving her left side spontaneously with minimal movement of her right side. Patient is not on any anticoagulation. PHYSICAL EXAM:  /65   Pulse 75   Temp 98.1 °F (36.7 °C) (Temporal)   Resp 17   Ht 5' 3\" (1.6 m)   Wt 135 lb 12.8 oz (61.6 kg)   SpO2 100%   BMI 24.06 kg/m²     GENERAL:  Intubated. In no acute distress. HEAD:  Normocephalic, atraumatic. EYES:   No scleral icterus. Pupils reactive. R 5 mm and L 3 mm. LUNGS:  On ventilator- ACVC 16/380/5/35. Breath sounds clear and equal bilaterally. CARDIOVASCULAR: Regular rate, normotensive. No murmurs, gallops, or rubs. ABDOMEN:  Soft, non-distended, non-tender. No guarding, rigidity, rebound. EXTREMITIES:   Moves left side spontaneously. Minimal movement on right. Appears to withdraw right side to pain. Atraumatic. No LE edema. SKIN:  Warm and dry  NEUROLOGIC:  GCS 6T    ASSESSMENT / PLAN:  Neuro:  ICH/SDH- repeat CT head as we cannot view imaging from outside hospital. Neurology and Neurosurgery consulted. Loaded with Keppra at outside facility- continue Keppra 500 BID. Serial neurologic checks. DC all sedation. Will start 3% once central line placed.  Resume home Cymbalta. CV: No acute issues. Will insert arterial line. Maintain SBP < 140. On Cardene gtt. Hydralazine and labetalol prn. Restrt home Inderal.  Pulm: Acute respiratory failure- wean from ventilator. Concern for aspiration- start Unasyn. GI: No acute issues. NPO. SLP to see when extubated. Bowel regimen. Zofran PRN. Renal: No acute issues. Trend BUN/Cr/UOP. Replace electrolytes prn. Endocrine: No acute issues. Maintain blood glucose < 180. MSK: No acute issues. PT/OT when extubated. Heme: No acute issues. Daily CBC. ID: Concern for aspiration pneumonia- start Unasyn. Trend WBC and fever curve. Pain/Analgesia: Tylenol  Bowel regimen: Glycolax  DVT proph:  SCDs  GI proph:  Pepcid   Glucose protocol: Not indicated    Mouth/eye care: As needed per patient  [place]:   Present 11/25  CVC sites: Will place arterial and central line    Ancillary consults: Neurosurgery, neurology, medicine  Family Update:  At bedside    Disposition: Sandra Salazar MD  Resident, PGY-2  11/25/2022  11:05 PM

## 2022-11-26 NOTE — PROGRESS NOTES
Speech Language Pathology      NAME:  Vonda Mcgee  :  1955  DATE: 2022  ROOM:  3820/3820-A    Chart review indicating that Pt is now intubated. Will DC from SLP caseload. Please re-order Speech Pathology as warranted/ appropriate. Thank you.        Intraparenchymal hematoma of brain, unspecified laterality, with loc of unspecified duration, sequela [facility] [P57.753J]

## 2022-11-26 NOTE — PROGRESS NOTES
Patient's son at the bedside, unsure if patient has advance directive and would like her to remain a full code until they find if she has a DNR.

## 2022-11-26 NOTE — PROGRESS NOTES
Comprehensive Nutrition Assessment    Type and Reason for Visit:  Initial, Consult    Nutrition Recommendations/Plan:   Tube feeding recommendation per physician consult. Recommend Immune Enhancing (Pivot 1.5) @35/hr to provide 840ml, 1260 calories (1465 calories w/ current propofol), 79g protein, 638ml water. Current Propofol @7.79ml provides 205 calories daily from lipids. Malnutrition Assessment:  Malnutrition Status: At risk for malnutrition (Comment) (11/26/22 1795)    Context:  Acute Illness     Findings of the 6 clinical characteristics of malnutrition:  Energy Intake:  Mild decrease in energy intake (Comment) (NPO since admission)  Weight Loss:  Unable to assess (d/t lack of weight history)     Body Fat Loss:  Unable to assess     Muscle Mass Loss:  Unable to assess    Fluid Accumulation:  No significant fluid accumulation     Strength:  Not Performed    Nutrition Assessment:    Patient currently NPO ; pt also intubated and sedated ; on propofol ; adm found down ; noted intraparenchymal hemorrhage and intraparenchymal hematoma ; noted SDH ; noted acute respiratory failure ; will provide TF recommendations    Nutrition Related Findings:    -I&Os (-1.5 L), no edema, intubated/sedated, active BS, N/V, OGT to suction ; Wound Type: None       Current Nutrition Intake & Therapies:    Average Meal Intake: NPO     Diet NPO    Anthropometric Measures:  Height: 5' 3\" (160 cm)  Ideal Body Weight (IBW): 115 lbs (52 kg)       Current Body Weight: 135 lb (61.2 kg) (11/25, actual), 117.4 % IBW. Current BMI (kg/m2): 23.9  Usual Body Weight:  (UTO ; no weights available in EMR hx from previous encounters)                       BMI Categories: Normal Weight (BMI 22.0 to 24.9) age over 72    Estimated Daily Nutrient Needs:  Energy Requirements Based On: Formula  Weight Used for Energy Requirements: Current  Energy (kcal/day): PS3B 8570-2126 (minute volume 7.15 ;  Tmax 100.4)  Weight Used for Protein Requirements: Current  Protein (g/day): 75-95 (1.3-1.5g/kg CBW)  Method Used for Fluid Requirements: 1 ml/kcal  Fluid (ml/day): per critical care    Nutrition Diagnosis:   Inadequate oral intake related to impaired respiratory function as evidenced by NPO or clear liquid status due to medical condition, intubation, nutrition support - enteral nutrition    Nutrition Interventions:   Food and/or Nutrient Delivery: Continue NPO, Start Tube Feeding  Nutrition Education/Counseling: No recommendation at this time  Coordination of Nutrition Care: Continue to monitor while inpatient       Goals:  Previous Goal Met: Progressing toward Goal(s)  Goals: within 2 days, Initiate nutrition support       Nutrition Monitoring and Evaluation:   Behavioral-Environmental Outcomes: None Identified  Food/Nutrient Intake Outcomes: Enteral Nutrition Intake/Tolerance  Physical Signs/Symptoms Outcomes: Biochemical Data, GI Status, Fluid Status or Edema, Nausea or Vomiting, Hemodynamic Status, Nutrition Focused Physical Findings, Skin, Weight    Discharge Planning:     Too soon to determine     Qian Gu RD, LD  Contact: [phone]

## 2022-11-26 NOTE — CONSULTS
Palliative Care Department  [phone]  Palliative Care Initial Consult  Provider Carmina Diego, APRN - [id number] [facility] [place]  [id number]  Hospital Day: 2  Date of Initial Consult: 11/25/2022  Referring Provider: Isadora Chauhan MD  Palliative Medicine was consulted for assistance with: Goals of care    HPI:   Ja Norris is a 69 y.o. with a medical history of HTN and GERD who was admitted on 11/25/2022 from [facility] ED with a CHIEF COMPLAINT of AMS. Patient was found down in her year by her neighbors. Patient was seen about a week ago for complaints of headache. CT revealed  5x7 intraparenchymal hematoma in left middle parietal lobe with edema 8mm  midline shift  and large subdural hematoma 3-5 m in thickness. Patient was admitted to SICU for further medical management and palliative medicine was consulted for goals of care. ASSESSMENT/PLAN:     Pertinent Hospital Diagnoses     ICH/SDH    Palliative Care Encounter / Counseling Regarding Goals of Care  Please see detailed goals of care discussion as below  At this time, Ja Norris, Does Not have capacity for medical decision-making.   Capacity is time limited and situation/question specific  During encounter Curtis was surrogate medical decision-maker  Outcome of goals of care meeting:   Change code status to [facility]  Compassionately extubate when all family arrives at bedside  Code status DNR-CC  Advanced Directives: no POA or living will in epic  Surrogate/Legal NOK:  Paulina Yung (child) [phone]    Spiritual assessment: no spiritual distress identified  Bereavement and grief: to be determined  Referrals to: none today  SUBJECTIVE:     Current medical issues leading to Palliative Medicine involvement include   Active Hospital Problems    Diagnosis Date Noted    Intracranial hemorrhage ([facility] [I62.9] 11/26/2022     Priority: Medium    Intraparenchymal hematoma of brain, unspecified laterality, with loc of unspecified duration, sequela ([address] [Z89.815L] 11/25/2022     Priority: Medium       Details of Conversation:    Chart reviewed. Update received from nursing, critical care team and neurology. Patient seen intubated in SICU. Son, Fannie Vazquez, at bedside. Family received update from neurology. Role of palliative medicine introduced. Fannie Vazquez states his mother is not  and she has 2 children. Fannie Vazquez states his brother lives in [place] and has decided not to come up to visit his mother however they are updating him via phone. Ananth Hurtado does not have a living will or healthcare power of . In-depth discussion regarding current conditions, goals of care and CODE STATUS options. Fannie Vazquez states his mother has made it very clear that she does not want to live on machines. 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 States she has had a hard time since she lost her  recently to [facility]. In-depth discussion regarding compassionate extubation process and medications. At this time Fannie Vazquez states he would like to change CODE STATUS to DNR CC and continue with compassionate extubation when the rest of his family is able to arrive at bedside, likely early afternoon. Extensive amount of time providing emotional support and answering all questions. Nursing and critical care updated on plan. Will follow.     OBJECTIVE:   Prognosis: Poor    Physical Exam:  BP (!) 141/73   Pulse 84   Temp 100 °F (37.8 °C)   Resp 21   Ht 5' 3\" (1.6 m)   Wt 135 lb 12.8 oz (61.6 kg)   SpO2 100%   BMI 24.06 kg/m²   Constitutional:  alert, eyes open, not following command  Lungs:  CTA bilaterally, no audible rhonchi or wheezes noted, respirations unlabored, no retractions  Heart: RRR, distant heart tones, no murmur, rub, or gallop noted during exam  Abd:  Soft, non tender, non distended, bowel sounds present  Skin:  Warm and dry, no rashes on visible skin  Neuro:  Alert, not following commands    Objective data reviewed: labs, images, records, medication use, vitals, and chart    Discussed patient and the plan of care with the other IDT members: Palliative Medicine IDT Team, Primary Team, Floor Nurse, and Family    Time/Communication  Greater than 50% of time spent, total 50 minutes in counseling and coordination of care at the bedside regarding goals of care and diagnosis and prognosis. Thank you for allowing Palliative Medicine to participate in the care of Florence Betts.

## 2022-11-26 NOTE — PROCEDURES
Merline Comment is a 69 y.o. female patient. 1. Intraparenchymal hematoma of brain, unspecified laterality, with loc of unspecified duration, sequela ([address]      No past medical history on file. Blood pressure (!) 149/68, pulse 72, temperature 98.1 °F (36.7 °C), temperature source Temporal, resp. rate 18, height 5' 3\" (1.6 m), weight 135 lb 12.8 oz (61.6 kg), SpO2 100 %. Insert Arterial Line    Date/Time: 11/26/2022 1:17 AM  Performed by: Bruce Brunner MD  Authorized by: Bruce Brunner MD   Consent: Verbal consent obtained. Risks and benefits: risks, benefits and alternatives were discussed  Test results: test results available and properly labeled  Site marked: the operative site was marked  Imaging studies: imaging studies available  Required items: required blood products, implants, devices, and special equipment available  Patient identity confirmed: anonymous protocol, patient vented/unresponsive  Indications: multiple ABGs and hemodynamic monitoring  Location: right radial  Anesthesia: see MAR for details  Needle gauge: 20  Seldinger technique: Seldinger technique used  Number of attempts: 2  Post-procedure: line sutured and dressing applied  Post-procedure CMS: normal  Patient tolerance: patient tolerated the procedure well with no immediate complications    Dr. Chucho Gusman was readily available throughout the entire procedure.      Bassam Griffiths MD  11/26/2022

## 2022-11-26 NOTE — PLAN OF CARE
Problem: Neurosensory - Adult  Goal: Absence of seizures  Outcome: Progressing  Flowsheets (Taken 11/26/2022 0328)  Absence of seizures:   Monitor for seizure activity. If seizure occurs, document type and location of movements and any associated apnea   Support airway/breathing, administer oxygen as needed     Problem: Respiratory - Adult  Goal: Achieves optimal ventilation and oxygenation  Outcome: Progressing  Flowsheets (Taken 11/26/2022 0328)  Achieves optimal ventilation and oxygenation:   Assess for changes in respiratory status   Respiratory therapy support as indicated   Assess the need for suctioning and aspirate as needed  Note: Patient remains intubated, concerns for aspiration. Problem: Neurosensory - Adult  Goal: Achieves stable or improved neurological status  Flowsheets (Taken 11/26/2022 0328)  Achieves stable or improved neurological status:   Assess for and report changes in neurological status   Initiate measures to prevent increased intracranial pressure   Monitor temperature, glucose, and sodium. Initiate appropriate interventions as ordered  Note: Patient on 3% NS.  Routine neuro exams

## 2022-11-26 NOTE — PROGRESS NOTES
Patient continues to reach and pull ETT and critical lines. Educated on importance of ETT and lines, no evidence of learning. Left soft wrist restraint continued per order for patient safety. Patient not attempting to pull lines and tubes with right arm, right wrist restraint discontinued.  Lb Ocampo RN

## 2022-11-26 NOTE — CONSULTS
Neurosurgery Consult Note      CHIEF COMPLAINT: Left temporal intracerebral hemorrhage and subdural hematoma    HPI:67 y.o. female with past medical history of hypertension GERD PMR  . Patient is a transfer from [facility] . Patient is intubated and sedated . She was found down in her yard by her neighbors . It is unclear how long she was down for . Dhruv Araya Patient was seen 5-6 days ago due to headache. and CT head and CT angio were negative Son states that he last spoke to patient at 1300 and she was well . CT revealed  5x7 intraparenchymal hematoma in left middle parietal lobe with edema 8mm  midline shift  and large subdural hematoma 3-5 m in thickness . Patient was intubated and pressures were noted to be elevated and she was placed on nicardipine drip . Patient is not on anticoagulation  ECG revealed sinus bradycardia . CBC unremarkable  CMP revealed sodium 133  BUN 23 SCr  0.7 glucose 153  VBG revealed respiratory alkalosis  CXR was negative . Patient was given Keppra as well,. Patient was transferred to [address] [place] [address] for further evaluation CTA of the head was interpreted as bilateral middle cerebral artery aneurysmsAs well as a large stable parenchymal hemorrhage within the left temporal lobe lobe with 6 mm of midline shift she presented with a dilated left sided pupil a right hemiplegia and unconsciousness counseled the family extensively at the bedside all questions were answered reviewed the images with them, patient is right-handed and according to the family was depressed from her  recently passing away and actually had a poor quality of life since then    History reviewed. No pertinent past medical history. Past Surgical History:   Procedure Laterality Date    BREAST BIOPSY Right     negative    HERNIA REPAIR Right     groin    LUMBAR LAMINECTOMY       Zithromax [azithromycin]  Prior to Admission medications    Medication Sig Start Date End Date Taking?  Authorizing Provider   Coral Calcium-Magnesium-Vit D 250-125-100 MG-MG-UNIT CAPS Take by mouth    Historical Provider, MD   VITAMIN D PO Take 1,000 Units by mouth    Historical Provider, MD   omeprazole (PRILOSEC) 20 MG delayed release capsule Take 20 mg by mouth daily    Historical Provider, MD   propranolol (INDERAL) 10 MG tablet Take 10 mg by mouth 3 times daily    Historical Provider, MD   zolpidem (AMBIEN) 10 MG tablet Take by mouth nightly as needed for Sleep. Historical Provider, MD   predniSONE (DELTASONE) 2.5 MG tablet Take 2.5 mg by mouth daily    Historical Provider, MD   meloxicam (MOBIC) 15 MG tablet Take 15 mg by mouth daily    Historical Provider, MD   DULoxetine (CYMBALTA) 60 MG extended release capsule Take 60 mg by mouth daily    Historical Provider, MD     No outpatient medications have been marked as taking for the 11/25/22 encounter [facility] Encounter).      Social History     Socioeconomic History    Marital status: Unknown     Spouse name: Not on file    Number of children: Not on file    Years of education: Not on file    Highest education level: Not on file   Occupational History    Not on file   Tobacco Use    Smoking status: Never    Smokeless tobacco: Not on file   Substance and Sexual Activity    Alcohol use: Not on file    Drug use: Never    Sexual activity: Not on file   Other Topics Concern    Not on file   Social History Narrative    Not on file     Social Determinants of Health     Financial Resource Strain: Not on file   Food Insecurity: Not on file   Transportation Needs: Not on file   Physical Activity: Not on file   Stress: Not on file   Social Connections: Not on file   Intimate Partner Violence: Not on file   Housing Stability: Not on file     Family History   Problem Relation Age of Onset    Breast Cancer Mother     Other Son         thalassemia       ROS: Unobtainable    VITALS/DRAINS:   VITALS:  BP (!) 141/73   Pulse 75   Temp 100 °F (37.8 °C) (Esophageal)   Resp 15   Ht 5' 3\" (1.6 m)   Wt 135 lb 12.8 oz (61.6 kg) SpO2 100%   BMI 24.06 kg/m²   24HR INTAKE/OUTPUT:    Intake/Output Summary (Last 24 hours) at 11/26/2022 1621  Last data filed at 11/26/2022 1500  Gross per 24 hour   Intake 1553.18 ml   Output 2695 ml   Net -1141.82 ml       EXAMINATION: Currently intubated opens eyes spontaneously tracks visually purposeful on the left side minimal movement on the right side pupils presently right larger than left both reacting    imagING STUDIES:  CT head without contrast    Result Date: 11/26/2022  EXAMINATION: CTA OF THE HEAD WITH CONTRAST; CTV OF THE HEAD WITH CONTRAST; CT OF THE HEAD WITHOUT CONTRAST 11/26/2022 6:14 am TECHNIQUE: CTA of the head/brain was performed with the administration of intravenous contrast. Multiplanar reformatted images are provided for review. MIP images are provided for review. Automated exposure control, iterative reconstruction, and/or weight based adjustment of the mA/kV was utilized to reduce the radiation dose to as low as reasonably achievable.; CT venogram of the head/brain was performed with the administration of intravenous contrast. Multiplanar reformatted images are provided for review. MIP images are provided for review. Automated exposure control, iterative reconstruction, and/or weight based adjustment of the mA/kV was utilized to reduce the radiation dose to as low as reasonably achievable.; CT of the head was performed without the administration of intravenous contrast. Automated exposure control, iterative reconstruction, and/or weight based adjustment of the mA/kV was utilized to reduce the radiation dose to as low as reasonably achievable.  COMPARISON: Earlier same day head CT HISTORY: ORDERING SYSTEM PROVIDED HISTORY: left temporal IPH FINDINGS: CT HEAD: BRAIN/VENTRICLES:  Accounting for patient positioning and technique, there is no significant change in the large acute parenchymal hemorrhage centered within the left temporal lobe with similar localized mass effect including sulcal and fissural effacement, moderate to severe compression of the left lateral ventricle and 6 mm rightward midline shift. A small amount acute layering intraventricular hemorrhage again noted within the occipital horns. There is a similar adjacent 6 mm thickness subdural hematoma extending along the left frontotemporal convexity. No large territory infarct. Stable caliber and configuration of the ventricles. No michelle uncal herniation. ORBITS: The visualized portion of the orbits demonstrate no acute abnormality. SINUSES:  The visualized paranasal sinuses and mastoid air cells demonstrate no acute abnormality. SOFT TISSUES/SKULL: No acute abnormality of the visualized skull or soft tissues. CTA/CTV HEAD: ANTERIOR CIRCULATION: No significant stenosis of the intracranial internal carotid, anterior cerebral, or middle cerebral arteries. There is a 4 mm right MCA bifurcation aneurysm, superiorly and laterally directed. There is a 3 mm laterally, slightly inferiorly directed left MCA bifurcation aneurysm. No apparent blushing within the parenchymal hemorrhage to suggest active arterial extravasation. POSTERIOR CIRCULATION: No significant stenosis of the vertebral, basilar, or posterior cerebral arteries. No aneurysm. OTHER: No dural venous sinus thrombosis or high-grade stenosis. 1. No significant change compared to the earlier same day head CT with a large acute parenchymal hemorrhage centered within the left temporal lobe with localized mass effect, 6 mm rightward midline shift, small layering intraventricular hemorrhage along with adjacent left frontotemporal subdural hematoma. Stable caliber and configuration of the ventricles. 2. 4 mm right MCA bifurcation saccular aneurysm, as well as a 3 mm left MCA bifurcation saccular aneurysm. 3. No apparent blushing within the parenchymal hemorrhage to suggest active arterial extravasation. 4. Unremarkable CTV.      CT HEAD WO CONTRAST    Addendum Date: 11/26/2022    ADDENDUM: I reviewed the results with Dr. Chelly Rodriguez via telephone. Result Date: 11/26/2022  EXAMINATION: CT OF THE HEAD WITHOUT CONTRAST  11/26/2022 12:04 am TECHNIQUE: CT of the head was performed without the administration of intravenous contrast. Automated exposure control, iterative reconstruction, and/or weight based adjustment of the mA/kV was utilized to reduce the radiation dose to as low as reasonably achievable. COMPARISON: None. HISTORY: ORDERING SYSTEM PROVIDED HISTORY: ICH/SDH TECHNOLOGIST PROVIDED HISTORY: Has a \"code stroke\" or \"stroke alert\" been called? ->No Reason for exam:->ICH/SDH What reading provider will be dictating this exam?->CRC FINDINGS: BRAIN/VENTRICLES: 6.5 x 5.5 x 3.0 cm parenchymal hemorrhage centered on the superior left temporal lobe with small left frontal overlying subdural hematoma measuring up to 6.1 mm in thickness. Mass effect results in 8 mm of left to right midline shift at the foramen of Monro. Sulcal effacement over the left greater than right hemispheres. No loss of gray-white interface. ORBITS: The visualized portion of the orbits demonstrate no acute abnormality. SINUSES: The visualized paranasal sinuses and mastoid air cells demonstrate no acute abnormality. SOFT TISSUES/SKULL:  No acute abnormality of the visualized skull or soft tissues. 1. 6.5 X 5.5 x 3.0 cm parenchymal hemorrhage centered on the superior left temporal lobe with small left frontal overlying subdural hematoma measuring up to 6.1 mm in thickness. Mass effect results in 8 mm of left to right midline shift at the foramen of Monro. 2. No prior comparisons available on this database. The findings were sent to the Radiology Results [address] at 12:28 am on 11/26/2022 to put me in touch with a licensed caregiver to deliver the report. RECOMMENDATIONS: Careful clinical correlation and follow up recommended.      CT HEAD WO CONTRAST    Result Date: 11/25/2022 [address] [place] [address]                                              [phone]                                               CT Scan Report                                                  Signed    PatientMathew Mayfield [facility]                                                                MR#: [id number]  [id number]          : 1955                                                                [de-identified]            Age/Sex: 69 / F                                                                Admit Date: 22            Loc: ER                                                                                     Attending Dr:     Ordering Physician: Chris Green MD   Date of Service: 22  Procedure(s): CT head/brain wo con  Accession Number(s): [id number]    cc: Chris Green MD      HISTORY:  Unresponsive. TECHNIQUE:  Axial images through the head without contrast.       FINDINGS:  There is a 5 x 7 cm intraparenchymal hematoma in the left middle parietal lobe with   surrounding edema. Mass effect on the ventricles with effacement of the left lateral ventricle and   8 mm midline shift. Large acute subdural hematoma measuring 3-5 mm thickness extending along the left parietal lobe,   under the bilateral frontal lobes, and along the clivus abutting the midbrain and medulla. Sulci are prominent with low attenuation in the periventricular white matter suggesting mild edema. Impression   CT head with contrast:   1.  5 x 7 cm intraparenchymal hematoma in the left middle parietal lobe with surrounding edema. Mass effect on the ventricles with effacement of the left lateral ventricle and 8 mm midline shift.    2.  Large acute subdural hematoma measuring 3-5 mm thickness extending along the left parietal   lobe, under the bilateral frontal lobes, and along the clivus abutting the midbrain and medulla. Dictated ByAlma Bhagat MD   DD/DT: 22               Signed By: Magali Danielle MD 22            : FAIZA    XR CHEST PORTABLE    Result Date: 2022  EXAMINATION: ONE XRAY VIEW OF THE CHEST 2022 2:41 am COMPARISON: None. HISTORY: ORDERING SYSTEM PROVIDED HISTORY: post-central line placement TECHNOLOGIST PROVIDED HISTORY: Reason for exam:->post-central line placement What reading provider will be dictating this exam?->CRC FINDINGS: Endotracheal tube tip well position 3.1 cm above the vijay. Gastric tube well position proximal side-hole below the hemidiaphragms. Esophageal probe noted in position. Right jugular central venous catheter tip at the cavoatrial junction. Lungs clear with no pneumothorax. Osseous thorax intact. Endotracheal tube, gastric tube, esophageal probe, and right jugular central venous catheter appear in good position. No pneumothorax or effusion.      XR CHEST PORTABLE    Result Date: 2022                                      [address]                                              [phone]                                                XRay Report                                                  Signed    Yana [facility]                                                                MR#: [id number]  [id number]          : 1955                                                                [de-identified]            Age/Sex: 69 / F                                                                Admit Date: 22            Loc: ER                                                                                     Attending Dr:     Ordering Physician: Carlos Faustin MD   Date of Service: 22  Procedure(s): XR chest 1V portable  Accession Number(s): [id number]    cc: Kelvin Quintero MD              PHYSICIAN INDICATIONS:  Cough       TECHNIQUE: Portable view. FINDINGS:       The heart size is normal.       The lung fields are clear. No infiltrates are identified. No pleural effusions are present. Endotracheal tube 2.5 cm above the vijay and nasogastric tube in stomach. IMPRESSION:       Chest, Portable:       1. No active pulmonary disease. 2.  Lines and tubes in place. No pneumothorax. Dictated ByColette Boyd MD   DD/DT: 11/25/22 1810               Signed By: Vijay Hilton MD 11/25/22 1810            : FAIZA    CTA HEAD W CONTRAST    Result Date: 11/26/2022  EXAMINATION: CTA OF THE HEAD WITH CONTRAST; CTV OF THE HEAD WITH CONTRAST; CT OF THE HEAD WITHOUT CONTRAST 11/26/2022 6:14 am TECHNIQUE: CTA of the head/brain was performed with the administration of intravenous contrast. Multiplanar reformatted images are provided for review. MIP images are provided for review. Automated exposure control, iterative reconstruction, and/or weight based adjustment of the mA/kV was utilized to reduce the radiation dose to as low as reasonably achievable.; CT venogram of the head/brain was performed with the administration of intravenous contrast. Multiplanar reformatted images are provided for review. MIP images are provided for review. Automated exposure control, iterative reconstruction, and/or weight based adjustment of the mA/kV was utilized to reduce the radiation dose to as low as reasonably achievable.; CT of the head was performed without the administration of intravenous contrast. Automated exposure control, iterative reconstruction, and/or weight based adjustment of the mA/kV was utilized to reduce the radiation dose to as low as reasonably achievable.  COMPARISON: Earlier same day head CT HISTORY: ORDERING SYSTEM PROVIDED HISTORY: left temporal IPH FINDINGS: CT HEAD: BRAIN/VENTRICLES:  Accounting for patient positioning and technique, there is no significant change in the large acute parenchymal hemorrhage centered within the left temporal lobe with similar localized mass effect including sulcal and fissural effacement, moderate to severe compression of the left lateral ventricle and 6 mm rightward midline shift. A small amount acute layering intraventricular hemorrhage again noted within the occipital horns. There is a similar adjacent 6 mm thickness subdural hematoma extending along the left frontotemporal convexity. No large territory infarct. Stable caliber and configuration of the ventricles. No michelle uncal herniation. ORBITS: The visualized portion of the orbits demonstrate no acute abnormality. SINUSES:  The visualized paranasal sinuses and mastoid air cells demonstrate no acute abnormality. SOFT TISSUES/SKULL: No acute abnormality of the visualized skull or soft tissues. CTA/CTV HEAD: ANTERIOR CIRCULATION: No significant stenosis of the intracranial internal carotid, anterior cerebral, or middle cerebral arteries. There is a 4 mm right MCA bifurcation aneurysm, superiorly and laterally directed. There is a 3 mm laterally, slightly inferiorly directed left MCA bifurcation aneurysm. No apparent blushing within the parenchymal hemorrhage to suggest active arterial extravasation. POSTERIOR CIRCULATION: No significant stenosis of the vertebral, basilar, or posterior cerebral arteries. No aneurysm. OTHER: No dural venous sinus thrombosis or high-grade stenosis. 1. No significant change compared to the earlier same day head CT with a large acute parenchymal hemorrhage centered within the left temporal lobe with localized mass effect, 6 mm rightward midline shift, small layering intraventricular hemorrhage along with adjacent left frontotemporal subdural hematoma. Stable caliber and configuration of the ventricles.  2. 4 mm right MCA bifurcation saccular aneurysm, as well as a 3 mm left MCA bifurcation saccular aneurysm. 3. No apparent blushing within the parenchymal hemorrhage to suggest active arterial extravasation. 4. Unremarkable CTV. CTA VENOUS HEAD W WO CONTRAST    Result Date: 11/26/2022  EXAMINATION: CTA OF THE HEAD WITH CONTRAST; CTV OF THE HEAD WITH CONTRAST; CT OF THE HEAD WITHOUT CONTRAST 11/26/2022 6:14 am TECHNIQUE: CTA of the head/brain was performed with the administration of intravenous contrast. Multiplanar reformatted images are provided for review. MIP images are provided for review. Automated exposure control, iterative reconstruction, and/or weight based adjustment of the mA/kV was utilized to reduce the radiation dose to as low as reasonably achievable.; CT venogram of the head/brain was performed with the administration of intravenous contrast. Multiplanar reformatted images are provided for review. MIP images are provided for review. Automated exposure control, iterative reconstruction, and/or weight based adjustment of the mA/kV was utilized to reduce the radiation dose to as low as reasonably achievable.; CT of the head was performed without the administration of intravenous contrast. Automated exposure control, iterative reconstruction, and/or weight based adjustment of the mA/kV was utilized to reduce the radiation dose to as low as reasonably achievable. COMPARISON: Earlier same day head CT HISTORY: ORDERING SYSTEM PROVIDED HISTORY: left temporal IPH FINDINGS: CT HEAD: BRAIN/VENTRICLES:  Accounting for patient positioning and technique, there is no significant change in the large acute parenchymal hemorrhage centered within the left temporal lobe with similar localized mass effect including sulcal and fissural effacement, moderate to severe compression of the left lateral ventricle and 6 mm rightward midline shift. A small amount acute layering intraventricular hemorrhage again noted within the occipital horns.  There is a similar adjacent 6 mm thickness subdural hematoma extending along the left frontotemporal convexity. No large territory infarct. Stable caliber and configuration of the ventricles. No michelle uncal herniation. ORBITS: The visualized portion of the orbits demonstrate no acute abnormality. SINUSES:  The visualized paranasal sinuses and mastoid air cells demonstrate no acute abnormality. SOFT TISSUES/SKULL: No acute abnormality of the visualized skull or soft tissues. CTA/CTV HEAD: ANTERIOR CIRCULATION: No significant stenosis of the intracranial internal carotid, anterior cerebral, or middle cerebral arteries. There is a 4 mm right MCA bifurcation aneurysm, superiorly and laterally directed. There is a 3 mm laterally, slightly inferiorly directed left MCA bifurcation aneurysm. No apparent blushing within the parenchymal hemorrhage to suggest active arterial extravasation. POSTERIOR CIRCULATION: No significant stenosis of the vertebral, basilar, or posterior cerebral arteries. No aneurysm. OTHER: No dural venous sinus thrombosis or high-grade stenosis. 1. No significant change compared to the earlier same day head CT with a large acute parenchymal hemorrhage centered within the left temporal lobe with localized mass effect, 6 mm rightward midline shift, small layering intraventricular hemorrhage along with adjacent left frontotemporal subdural hematoma. Stable caliber and configuration of the ventricles. 2. 4 mm right MCA bifurcation saccular aneurysm, as well as a 3 mm left MCA bifurcation saccular aneurysm. 3. No apparent blushing within the parenchymal hemorrhage to suggest active arterial extravasation. 4. Unremarkable CTV.        LABS:  CBC:   Lab Results   Component Value Date/Time    WBC 14.9 11/26/2022 04:50 AM    RBC 4.73 11/26/2022 04:50 AM    HGB 10.7 11/26/2022 04:50 AM    HCT 32.4 11/26/2022 04:50 AM    MCV 68.5 11/26/2022 04:50 AM    MCH 22.6 11/26/2022 04:50 AM    MCHC 33.0 11/26/2022 04:50 AM    RDW 22.3 11/26/2022 04:50 AM     11/26/2022 04:50 AM    MPV 8.6 11/26/2022 04:50 AM     BMP:    Lab Results   Component Value Date/Time     11/26/2022 10:00 AM    K 3.3 11/26/2022 04:50 AM     11/26/2022 04:50 AM    CO2 18 11/26/2022 04:50 AM    BUN 13 11/26/2022 04:50 AM    LABALBU 3.8 11/26/2022 04:50 AM    CREATININE 0.5 11/26/2022 04:50 AM    CALCIUM 8.4 11/26/2022 04:50 AM    LABGLOM >60 11/26/2022 04:50 AM    GLUCOSE 188 11/26/2022 04:50 AM     Warfarin PT/INR:  No components found for: PTPATWAR, PTINRWAR    IMPRESSION: Large dominant temporal lobe intracerebral hematoma with a dense aphasia and right hemiparesis, incidental right middle cerebral artery aneurysm possible left middle cerebral artery aneurysm    RECOMMENDATIONS: For now no neurosurgical intervention recommended continued aggressive medical management as well as ventilatory support poor long-term prognosis for home independent living likely would remain densely receptively aphasic with a right hemiparesis Long discussion with the oldest son reviewed CT images with him he wants to talk to the rest of the family but is leaning towards comfort care only palliative care is here and is going to meet with them also    Thank you again for this consultation.       Maikel Lynne MD  11/26/2022

## 2022-11-26 NOTE — PLAN OF CARE
Problem: Respiratory - Adult  Goal: Achieves optimal ventilation and oxygenation  11/26/2022 0828 by Gricelda Velasco RCP  Outcome: Progressing  11/26/2022 0328 by Ronni Graves RN  Outcome: Progressing  Flowsheets (Taken 11/26/2022 0328)  Achieves optimal ventilation and oxygenation:   Assess for changes in respiratory status   Respiratory therapy support as indicated   Assess the need for suctioning and aspirate as needed  Note: Patient remains intubated, concerns for aspiration.

## 2022-11-26 NOTE — PROGRESS NOTES
The pt was suctioned prior to extubation. The pt was extubated to a 6 lpm nasal cannula per the order and the family's wishes.

## 2022-11-27 NOTE — PLAN OF CARE
Pt compassionalty extubated with life banc notified, resting comfortably with family at bedside. Neurology will sign off.

## 2022-11-27 NOTE — PROGRESS NOTES
Neurosurg progress note  VITALS:  BP (!) 141/73   Pulse 86   Temp 99 °F (37.2 °C) (Temporal)   Resp 22   Ht 5' 3\" (1.6 m)   Wt 135 lb 12.8 oz (61.6 kg)   SpO2 (!) 88%   BMI 24.06 kg/m²   24HR INTAKE/OUTPUT:    Intake/Output Summary (Last 24 hours) at 11/27/2022 1302  Last data filed at 11/27/2022 1100  Gross per 24 hour   Intake 120.38 ml   Output 1125 ml   Net -1004.62 ml     CT head without contrast    Result Date: 11/26/2022  EXAMINATION: CTA OF THE HEAD WITH CONTRAST; CTV OF THE HEAD WITH CONTRAST; CT OF THE HEAD WITHOUT CONTRAST 11/26/2022 6:14 am TECHNIQUE: CTA of the head/brain was performed with the administration of intravenous contrast. Multiplanar reformatted images are provided for review. MIP images are provided for review. Automated exposure control, iterative reconstruction, and/or weight based adjustment of the mA/kV was utilized to reduce the radiation dose to as low as reasonably achievable.; CT venogram of the head/brain was performed with the administration of intravenous contrast. Multiplanar reformatted images are provided for review. MIP images are provided for review. Automated exposure control, iterative reconstruction, and/or weight based adjustment of the mA/kV was utilized to reduce the radiation dose to as low as reasonably achievable.; CT of the head was performed without the administration of intravenous contrast. Automated exposure control, iterative reconstruction, and/or weight based adjustment of the mA/kV was utilized to reduce the radiation dose to as low as reasonably achievable.  COMPARISON: Earlier same day head CT HISTORY: ORDERING SYSTEM PROVIDED HISTORY: left temporal IPH FINDINGS: CT HEAD: BRAIN/VENTRICLES:  Accounting for patient positioning and technique, there is no significant change in the large acute parenchymal hemorrhage centered within the left temporal lobe with similar localized mass effect including sulcal and fissural effacement, moderate to severe compression of the left lateral ventricle and 6 mm rightward midline shift. A small amount acute layering intraventricular hemorrhage again noted within the occipital horns. There is a similar adjacent 6 mm thickness subdural hematoma extending along the left frontotemporal convexity. No large territory infarct. Stable caliber and configuration of the ventricles. No michelle uncal herniation. ORBITS: The visualized portion of the orbits demonstrate no acute abnormality. SINUSES:  The visualized paranasal sinuses and mastoid air cells demonstrate no acute abnormality. SOFT TISSUES/SKULL: No acute abnormality of the visualized skull or soft tissues. CTA/CTV HEAD: ANTERIOR CIRCULATION: No significant stenosis of the intracranial internal carotid, anterior cerebral, or middle cerebral arteries. There is a 4 mm right MCA bifurcation aneurysm, superiorly and laterally directed. There is a 3 mm laterally, slightly inferiorly directed left MCA bifurcation aneurysm. No apparent blushing within the parenchymal hemorrhage to suggest active arterial extravasation. POSTERIOR CIRCULATION: No significant stenosis of the vertebral, basilar, or posterior cerebral arteries. No aneurysm. OTHER: No dural venous sinus thrombosis or high-grade stenosis. 1. No significant change compared to the earlier same day head CT with a large acute parenchymal hemorrhage centered within the left temporal lobe with localized mass effect, 6 mm rightward midline shift, small layering intraventricular hemorrhage along with adjacent left frontotemporal subdural hematoma. Stable caliber and configuration of the ventricles. 2. 4 mm right MCA bifurcation saccular aneurysm, as well as a 3 mm left MCA bifurcation saccular aneurysm. 3. No apparent blushing within the parenchymal hemorrhage to suggest active arterial extravasation. 4. Unremarkable CTV.      CT HEAD WO CONTRAST    Addendum Date: 11/26/2022    ADDENDUM: I reviewed the results with  Kory via telephone. Result Date: 11/26/2022  EXAMINATION: CT OF THE HEAD WITHOUT CONTRAST  11/26/2022 12:04 am TECHNIQUE: CT of the head was performed without the administration of intravenous contrast. Automated exposure control, iterative reconstruction, and/or weight based adjustment of the mA/kV was utilized to reduce the radiation dose to as low as reasonably achievable. COMPARISON: None. HISTORY: ORDERING SYSTEM PROVIDED HISTORY: ICH/SDH TECHNOLOGIST PROVIDED HISTORY: Has a \"code stroke\" or \"stroke alert\" been called? ->No Reason for exam:->ICH/SDH What reading provider will be dictating this exam?->CRC FINDINGS: BRAIN/VENTRICLES: 6.5 x 5.5 x 3.0 cm parenchymal hemorrhage centered on the superior left temporal lobe with small left frontal overlying subdural hematoma measuring up to 6.1 mm in thickness. Mass effect results in 8 mm of left to right midline shift at the foramen of Monro. Sulcal effacement over the left greater than right hemispheres. No loss of gray-white interface. ORBITS: The visualized portion of the orbits demonstrate no acute abnormality. SINUSES: The visualized paranasal sinuses and mastoid air cells demonstrate no acute abnormality. SOFT TISSUES/SKULL:  No acute abnormality of the visualized skull or soft tissues. 1. 6.5 X 5.5 x 3.0 cm parenchymal hemorrhage centered on the superior left temporal lobe with small left frontal overlying subdural hematoma measuring up to 6.1 mm in thickness. Mass effect results in 8 mm of left to right midline shift at the foramen of Monro. 2. No prior comparisons available on this database. The findings were sent to the Radiology Results [address] at 12:28 am on 11/26/2022 to put me in touch with a licensed caregiver to deliver the report. RECOMMENDATIONS: Careful clinical correlation and follow up recommended.      CT HEAD WO CONTRAST    Result Date: 11/25/2022                                      [facility] [address], [address] [place], [place] [address]                                              [phone]                                               CT Scan Report                                                  Signed    [facility]                                                                MR#: [id number]  [id number]          : 1955                                                                [de-identified]            Age/Sex: 69 / F                                                                Admit Date: 22            Loc: ER                                                                                     Attending Dr:     Ordering Physician: Chris Green MD   Date of Service: 22  Procedure(s): CT head/brain wo con  Accession Number(s): [id number]    cc: Chris Green MD      HISTORY:  Unresponsive. TECHNIQUE:  Axial images through the head without contrast.       FINDINGS:  There is a 5 x 7 cm intraparenchymal hematoma in the left middle parietal lobe with   surrounding edema. Mass effect on the ventricles with effacement of the left lateral ventricle and   8 mm midline shift. Large acute subdural hematoma measuring 3-5 mm thickness extending along the left parietal lobe,   under the bilateral frontal lobes, and along the clivus abutting the midbrain and medulla. Sulci are prominent with low attenuation in the periventricular white matter suggesting mild edema. Impression   CT head with contrast:   1.  5 x 7 cm intraparenchymal hematoma in the left middle parietal lobe with surrounding edema. Mass effect on the ventricles with effacement of the left lateral ventricle and 8 mm midline shift. 2.  Large acute subdural hematoma measuring 3-5 mm thickness extending along the left parietal   lobe, under the bilateral frontal lobes, and along the clivus abutting the midbrain and medulla. Dictated ByWillie Esqueda MD   DD/DT: 22               Signed By: Iban Wang MD 22            : FAIZA    XR CHEST PORTABLE    Result Date: 2022  EXAMINATION: ONE XRAY VIEW OF THE CHEST 2022 2:41 am COMPARISON: None. HISTORY: ORDERING SYSTEM PROVIDED HISTORY: post-central line placement TECHNOLOGIST PROVIDED HISTORY: Reason for exam:->post-central line placement What reading provider will be dictating this exam?->CRC FINDINGS: Endotracheal tube tip well position 3.1 cm above the vijay. Gastric tube well position proximal side-hole below the hemidiaphragms. Esophageal probe noted in position. Right jugular central venous catheter tip at the cavoatrial junction. Lungs clear with no pneumothorax. Osseous thorax intact. Endotracheal tube, gastric tube, esophageal probe, and right jugular central venous catheter appear in good position. No pneumothorax or effusion.      XR CHEST PORTABLE    Result Date: 2022                                      [address]                                              [address]                                              [phone]                                                XRay Report                                                  Signed    [facility]                                                                MR#: [id number]  [id number]          : 1955                                                                [de-identified]            Age/Sex: 69 / F                                                                Admit Date: 22            Loc: ER                                                                                     Attending Dr:     Ordering Physician: Erika Pak MD   Date of Service: 22  Procedure(s): XR chest 1V portable  Accession Number(s): [id number]    cc: Kelvin Quintero MD              PHYSICIAN INDICATIONS:  Cough       TECHNIQUE: Portable view. FINDINGS:       The heart size is normal.       The lung fields are clear. No infiltrates are identified. No pleural effusions are present. Endotracheal tube 2.5 cm above the vijay and nasogastric tube in stomach. IMPRESSION:       Chest, Portable:       1. No active pulmonary disease. 2.  Lines and tubes in place. No pneumothorax. Dictated ByLois Desai MD   DD/DT: 11/25/22 1810               Signed By: Minesh Horowitz MD 11/25/22 1810            : FAIZA    CTA HEAD W CONTRAST    Result Date: 11/26/2022  EXAMINATION: CTA OF THE HEAD WITH CONTRAST; CTV OF THE HEAD WITH CONTRAST; CT OF THE HEAD WITHOUT CONTRAST 11/26/2022 6:14 am TECHNIQUE: CTA of the head/brain was performed with the administration of intravenous contrast. Multiplanar reformatted images are provided for review. MIP images are provided for review. Automated exposure control, iterative reconstruction, and/or weight based adjustment of the mA/kV was utilized to reduce the radiation dose to as low as reasonably achievable.; CT venogram of the head/brain was performed with the administration of intravenous contrast. Multiplanar reformatted images are provided for review. MIP images are provided for review. Automated exposure control, iterative reconstruction, and/or weight based adjustment of the mA/kV was utilized to reduce the radiation dose to as low as reasonably achievable.; CT of the head was performed without the administration of intravenous contrast. Automated exposure control, iterative reconstruction, and/or weight based adjustment of the mA/kV was utilized to reduce the radiation dose to as low as reasonably achievable.  COMPARISON: Earlier same day head CT HISTORY: ORDERING SYSTEM PROVIDED HISTORY: left temporal IPH FINDINGS: CT HEAD: BRAIN/VENTRICLES:  Accounting for patient positioning and technique, there is no significant change in the large acute parenchymal hemorrhage centered within the left temporal lobe with similar localized mass effect including sulcal and fissural effacement, moderate to severe compression of the left lateral ventricle and 6 mm rightward midline shift. A small amount acute layering intraventricular hemorrhage again noted within the occipital horns. There is a similar adjacent 6 mm thickness subdural hematoma extending along the left frontotemporal convexity. No large territory infarct. Stable caliber and configuration of the ventricles. No michelle uncal herniation. ORBITS: The visualized portion of the orbits demonstrate no acute abnormality. SINUSES:  The visualized paranasal sinuses and mastoid air cells demonstrate no acute abnormality. SOFT TISSUES/SKULL: No acute abnormality of the visualized skull or soft tissues. CTA/CTV HEAD: ANTERIOR CIRCULATION: No significant stenosis of the intracranial internal carotid, anterior cerebral, or middle cerebral arteries. There is a 4 mm right MCA bifurcation aneurysm, superiorly and laterally directed. There is a 3 mm laterally, slightly inferiorly directed left MCA bifurcation aneurysm. No apparent blushing within the parenchymal hemorrhage to suggest active arterial extravasation. POSTERIOR CIRCULATION: No significant stenosis of the vertebral, basilar, or posterior cerebral arteries. No aneurysm. OTHER: No dural venous sinus thrombosis or high-grade stenosis. 1. No significant change compared to the earlier same day head CT with a large acute parenchymal hemorrhage centered within the left temporal lobe with localized mass effect, 6 mm rightward midline shift, small layering intraventricular hemorrhage along with adjacent left frontotemporal subdural hematoma. Stable caliber and configuration of the ventricles. 2. 4 mm right MCA bifurcation saccular aneurysm, as well as a 3 mm left MCA bifurcation saccular aneurysm. 3. No apparent blushing within the parenchymal hemorrhage to suggest active arterial extravasation. 4. Unremarkable CTV. CTA VENOUS HEAD W WO CONTRAST    Result Date: 11/26/2022  EXAMINATION: CTA OF THE HEAD WITH CONTRAST; CTV OF THE HEAD WITH CONTRAST; CT OF THE HEAD WITHOUT CONTRAST 11/26/2022 6:14 am TECHNIQUE: CTA of the head/brain was performed with the administration of intravenous contrast. Multiplanar reformatted images are provided for review. MIP images are provided for review. Automated exposure control, iterative reconstruction, and/or weight based adjustment of the mA/kV was utilized to reduce the radiation dose to as low as reasonably achievable.; CT venogram of the head/brain was performed with the administration of intravenous contrast. Multiplanar reformatted images are provided for review. MIP images are provided for review. Automated exposure control, iterative reconstruction, and/or weight based adjustment of the mA/kV was utilized to reduce the radiation dose to as low as reasonably achievable.; CT of the head was performed without the administration of intravenous contrast. Automated exposure control, iterative reconstruction, and/or weight based adjustment of the mA/kV was utilized to reduce the radiation dose to as low as reasonably achievable. COMPARISON: Earlier same day head CT HISTORY: ORDERING SYSTEM PROVIDED HISTORY: left temporal IPH FINDINGS: CT HEAD: BRAIN/VENTRICLES:  Accounting for patient positioning and technique, there is no significant change in the large acute parenchymal hemorrhage centered within the left temporal lobe with similar localized mass effect including sulcal and fissural effacement, moderate to severe compression of the left lateral ventricle and 6 mm rightward midline shift. A small amount acute layering intraventricular hemorrhage again noted within the occipital horns.  There is a similar adjacent 6 mm thickness subdural hematoma extending along the left frontotemporal convexity. No large territory infarct. Stable caliber and configuration of the ventricles. No michelle uncal herniation. ORBITS: The visualized portion of the orbits demonstrate no acute abnormality. SINUSES:  The visualized paranasal sinuses and mastoid air cells demonstrate no acute abnormality. SOFT TISSUES/SKULL: No acute abnormality of the visualized skull or soft tissues. CTA/CTV HEAD: ANTERIOR CIRCULATION: No significant stenosis of the intracranial internal carotid, anterior cerebral, or middle cerebral arteries. There is a 4 mm right MCA bifurcation aneurysm, superiorly and laterally directed. There is a 3 mm laterally, slightly inferiorly directed left MCA bifurcation aneurysm. No apparent blushing within the parenchymal hemorrhage to suggest active arterial extravasation. POSTERIOR CIRCULATION: No significant stenosis of the vertebral, basilar, or posterior cerebral arteries. No aneurysm. OTHER: No dural venous sinus thrombosis or high-grade stenosis. 1. No significant change compared to the earlier same day head CT with a large acute parenchymal hemorrhage centered within the left temporal lobe with localized mass effect, 6 mm rightward midline shift, small layering intraventricular hemorrhage along with adjacent left frontotemporal subdural hematoma. Stable caliber and configuration of the ventricles. 2. 4 mm right MCA bifurcation saccular aneurysm, as well as a 3 mm left MCA bifurcation saccular aneurysm. 3. No apparent blushing within the parenchymal hemorrhage to suggest active arterial extravasation. 4. Unremarkable CTV.      WBC:    Lab Results   Component Value Date/Time    WBC 14.9 11/26/2022 04:50 AM     BMP:    Lab Results   Component Value Date/Time     11/26/2022 10:00 AM    K 3.3 11/26/2022 04:50 AM     11/26/2022 04:50 AM    CO2 18 11/26/2022 04:50 AM    BUN 13 11/26/2022 04:50 AM    LABALBU 3.8 11/26/2022 04:50 AM    CREATININE 0.5 11/26/2022 04:50 AM CALCIUM 8.4 11/26/2022 04:50 AM    LABGLOM >60 11/26/2022 04:50 AM    GLUCOSE 188 11/26/2022 04:50 AM      levETIRAcetam  1,000 mg IntraVENous Once     Resting comfortably in the hospital bed no distress no eye-opening to voice pupils were equally round reactive  Assessment:  Patient Active Problem List   Diagnosis    Intraparenchymal hematoma of brain, unspecified laterality, with loc of unspecified duration, sequela (HCC)    Intracranial hemorrhage ([facility] [address]     Plan: Comfort care per family request  Mariam Arrington MD M.D.

## 2022-11-27 NOTE — PROGRESS NOTES
Pt resting comfortable without signs of distress, pain or anxiety.   VSS, will discuss Hospice when family arrives    Electronically signed by OSCAR Melo CNP on 11/27/2022 at 1:09 PM

## 2022-11-27 NOTE — FLOWSHEET NOTE
Family considering hospice facility in [facility] close to son's home

## 2022-11-27 NOTE — PROGRESS NOTES
Hospitalist Progress Note      SYNOPSIS: Patient admitted on 2022 (transferred from [facility])  69 y.o. female with past medical history of hypertension   With intraparenchymal hemorrhage left middle parietal lobe  SUBJECTIVE:    Intraparenchymal hemorrhage/5x7 intraparenchymal hematoma in left middle parietal lobe and  subdural hematoma     Extubated /compassionalty extubated  Code DNRCC    Temp (24hrs), Av.9 °F (37.7 °C), Min:99 °F (37.2 °C), Max:100.6 °F (38.1 °C)    DIET: Diet NPO  CODE: DNR-CC  OBJECTIVE:    BP (!) 141/73   Pulse 81   Temp 99 °F (37.2 °C) (Temporal)   Resp 17   Ht 5' 3\" (1.6 m)   Wt 135 lb 12.8 oz (61.6 kg)   SpO2 94%   BMI 24.06 kg/m²     General appearance: Elderly not diaphoretic appears to be resting comfortably  HEENT= no obv swelling to lips/mouth region  Respiratory: unlabored respirat status  no tachypnea   Derm= no hives or petechiae visible       ASSESSMENT:      Intraparenchymal hemorrhage/5x7 intraparenchymal hematoma in left middle parietal lobe and  subdural hematoma   Hypokalemia-likely due to decreased oral intake  Low C02 -on serum chemistry  Mild leukocytosis  Anemia microcytic-etiology unclear as microcytosis present prior to admission      With normal hemoglobin  Mild hyperglycemia-possibly secondary to inflammatory cytokines  Mild alk phos elevation-etiology unclear as yesterday this value was normal range         Clinical significance of this unclear    Currently on ventilator   Chest x-ray does not reveal clear evidence of infiltrate       Hypertension essential by history  History of PMR was documented on intake H&P from this facility-diagnosis not confirmed  History of spinal stenosis-/with neurogenic claudication-documented        Earlier this month  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     PLAN:  Dnr cc/compassionalty extubated      DISPOSITION:       Medications:  REVIEWED DAILY    Infusion Medications    morphine 4 mg/hr (11/27/22 0730)     Scheduled Medications    levETIRAcetam  1,000 mg IntraVENous Once     PRN Meds: sodium chloride flush, morphine **OR** morphine, LORazepam, glycopyrrolate, midazolam, ondansetron **OR** ondansetron    Labs:     Recent Labs     11/25/22 1712 11/26/22  0030 11/26/22  0450   WBC 8.2 13.9* 14.9*   HGB 12.3 10.9* 10.7*   HCT 37.8 33.5* 32.4*    268 253       Recent Labs     11/25/22 1712 11/26/22  0030 11/26/22  0450 11/26/22  1000   * 137 137 140   K 3.3* 3.6 3.3*  --     102 103  --    CO2 22 22 18*  --    BUN 23* 15 13  --    CREATININE 0.7 0.5 0.5  --    CALCIUM 9.4 8.9 8.4*  --    PHOS  --  2.4* 2.2*  --        Recent Labs     11/25/22 1712 11/26/22  0030 11/26/22  0450   PROT 7.9* 6.8 6.5   ALKPHOS 104 109* 107*   ALT 18 19 18   AST 21 25 22   BILITOT 0.5 0.4 0.5       Recent Labs     11/25/22 1712   INR 1.07       No results for input(s): Nelson Andrade in the last 72 hours. Chronic labs:    Lab Results   Component Value Date    CHOL 289 (H) 07/23/2021    TRIG 88 07/23/2021    HDL 75 07/23/2021    TSH 4.67 07/23/2021    INR 1.07 11/25/2022    LABA1C 4.8 11/26/2022       Radiology: REVIEWED DAILY    +++++++++++++++++++++++++++++++++++++++++++++++++  DO Marco A Acevedo Physician - [address] [facility], [place]  +++++++++++++++++++++++++++++++++++++++++++++++++  NOTE: This report was transcribed using voice recognition software. Every effort was made to ensure accuracy; however, inadvertent computerized transcription errors may be present.

## 2022-11-27 NOTE — PROGRESS NOTES
Family updated, they are unsure of Hospice secondary to previous unsatisfactory experiences. Did encourage family discussion as pt's VSS and need for plan of care. Family states they will discuss it and let us know there decision.     Electronically signed by OSCAR Mejias CNP on 11/27/2022 at 2:58 PM

## 2022-11-28 NOTE — PROGRESS NOTES
Palliative Care LSW met at bedside with pts son Danni Adams to offer ongoing support and review goals of care. Pt is a Lucia Lang does not feel she will survive much longer. He does not to risk her expiring during transport to a [facility] facility. he will be going to her house to feed her pets but will be available by telephone if needed.

## 2022-11-28 NOTE — PROGRESS NOTES
Hospitalist Progress Note      SYNOPSIS: Patient admitted on 2022 (transferred from [facility])  69 y.o. female with past medical history of hypertension   With intraparenchymal hemorrhage left middle parietal lobe  SUBJECTIVE:    Intraparenchymal hemorrhage/5x7 intraparenchymal hematoma in left middle parietal lobe and  subdural hematoma     Extubated /compassionalty extubated  Code [facility]    Sleepy, no reported vomiting. T-max 99    Temp (24hrs), Av °F (37.2 °C), Min:99 °F (37.2 °C), Max:99 °F (37.2 °C)    DIET: Diet NPO  CODE: DNR-CC  OBJECTIVE:    BP (!) 119/59   Pulse 99   Temp 99 °F (37.2 °C) (Temporal)   Resp 24   Ht 5' 3\" (1.6 m)   Wt 135 lb 12.8 oz (61.6 kg)   SpO2 (!) 70%   BMI 24.06 kg/m²     General appearance: Elderly  resting comfortably  HEENT= no obv swelling to lips/mouth region  Respiratory: unlabored respirat status  no tachypnea   Derm= no hives or petechiae visible       ASSESSMENT:      Intraparenchymal hemorrhage/5x7 intraparenchymal hematoma in left middle parietal lobe and  subdural hematoma   Intubated, continue supportive care.   Foot care  Hypokalemia-likely due to decreased oral intake  Low C02 -on serum chemistry  Mild leukocytosis  Anemia microcytic-etiology unclear as microcytosis present prior to admission      With normal hemoglobin  Mild hyperglycemia-possibly secondary to inflammatory cytokines  Mild alk phos elevation-etiology unclear as yesterday this value was normal range         Clinical significance of this unclear    Currently on ventilator   Chest x-ray does not reveal clear evidence of infiltrate       Hypertension essential by history  History of PMR was documented on intake H&P from this facility-diagnosis not confirmed  History of spinal stenosis-/with neurogenic claudication-documented        Earlier this month  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     PLAN:  Dnr cc/compassionalty extubated  Comfort care      DISPOSITION:       Medications:  REVIEWED DAILY    Infusion Medications    morphine 9 mg/hr (11/28/22 0536)     Scheduled Medications       PRN Meds: sodium chloride flush, morphine **OR** morphine, LORazepam, glycopyrrolate, midazolam, ondansetron **OR** ondansetron    Labs:     Recent Labs     11/25/22 1712 11/26/22  0030 11/26/22  0450   WBC 8.2 13.9* 14.9*   HGB 12.3 10.9* 10.7*   HCT 37.8 33.5* 32.4*    268 253       Recent Labs     11/25/22 1712 11/26/22  0030 11/26/22  0450 11/26/22  1000   * 137 137 140   K 3.3* 3.6 3.3*  --     102 103  --    CO2 22 22 18*  --    BUN 23* 15 13  --    CREATININE 0.7 0.5 0.5  --    CALCIUM 9.4 8.9 8.4*  --    PHOS  --  2.4* 2.2*  --        Recent Labs     11/25/22 1712 11/26/22  0030 11/26/22  0450   PROT 7.9* 6.8 6.5   ALKPHOS 104 109* 107*   ALT 18 19 18   AST 21 25 22   BILITOT 0.5 0.4 0.5       Recent Labs     11/25/22 1712   INR 1.07       No results for input(s): Anjali Pennington in the last 72 hours. Chronic labs:    Lab Results   Component Value Date    CHOL 289 (H) 07/23/2021    TRIG 88 07/23/2021    HDL 75 07/23/2021    TSH 4.67 07/23/2021    INR 1.07 11/25/2022    LABA1C 4.8 11/26/2022       Radiology: REVIEWED DAILY    +++++++++++++++++++++++++++++++++++++++++++++++++  Lina Gustafson MD  [employer] - [address] [facility], [place]  +++++++++++++++++++++++++++++++++++++++++++++++++  NOTE: This report was transcribed using voice recognition software. Every effort was made to ensure accuracy; however, inadvertent computerized transcription errors may be present.

## 2022-11-28 NOTE — CARE COORDINATION
Received call from Iam Mccollum at [facility] ([address] Hospice in [facility] [phone]. She received a referral from pt's son yesterday afternoon. They have a hospice house and have beds available. Iam Mccollum requested demos and clinical info be faxed to [phone]. Spoke with son Chely Reyez. He is on his way to the hospital and would like to hold off on hospice transfer at this time. Informed pt's nurse. She will notify me if he wants to proceed with hospice transfer.

## 2022-11-28 NOTE — PROGRESS NOTES
Neurosurg progress note  VITALS:  BP (!) 119/59   Pulse 99   Temp 99 °F (37.2 °C) (Temporal)   Resp 24   Ht 5' 3\" (1.6 m)   Wt 135 lb 12.8 oz (61.6 kg)   SpO2 (!) 70%   BMI 24.06 kg/m²   24HR INTAKE/OUTPUT:    Intake/Output Summary (Last 24 hours) at 11/28/2022 1230  Last data filed at 11/28/2022 0600  Gross per 24 hour   Intake 40 ml   Output 965 ml   Net -925 ml     CT head without contrast    Result Date: 11/26/2022  EXAMINATION: CTA OF THE HEAD WITH CONTRAST; CTV OF THE HEAD WITH CONTRAST; CT OF THE HEAD WITHOUT CONTRAST 11/26/2022 6:14 am TECHNIQUE: CTA of the head/brain was performed with the administration of intravenous contrast. Multiplanar reformatted images are provided for review. MIP images are provided for review. Automated exposure control, iterative reconstruction, and/or weight based adjustment of the mA/kV was utilized to reduce the radiation dose to as low as reasonably achievable.; CT venogram of the head/brain was performed with the administration of intravenous contrast. Multiplanar reformatted images are provided for review. MIP images are provided for review. Automated exposure control, iterative reconstruction, and/or weight based adjustment of the mA/kV was utilized to reduce the radiation dose to as low as reasonably achievable.; CT of the head was performed without the administration of intravenous contrast. Automated exposure control, iterative reconstruction, and/or weight based adjustment of the mA/kV was utilized to reduce the radiation dose to as low as reasonably achievable.  COMPARISON: Earlier same day head CT HISTORY: ORDERING SYSTEM PROVIDED HISTORY: left temporal IPH FINDINGS: CT HEAD: BRAIN/VENTRICLES:  Accounting for patient positioning and technique, there is no significant change in the large acute parenchymal hemorrhage centered within the left temporal lobe with similar localized mass effect including sulcal and fissural effacement, moderate to severe compression of the left lateral ventricle and 6 mm rightward midline shift. A small amount acute layering intraventricular hemorrhage again noted within the occipital horns. There is a similar adjacent 6 mm thickness subdural hematoma extending along the left frontotemporal convexity. No large territory infarct. Stable caliber and configuration of the ventricles. No michelle uncal herniation. ORBITS: The visualized portion of the orbits demonstrate no acute abnormality. SINUSES:  The visualized paranasal sinuses and mastoid air cells demonstrate no acute abnormality. SOFT TISSUES/SKULL: No acute abnormality of the visualized skull or soft tissues. CTA/CTV HEAD: ANTERIOR CIRCULATION: No significant stenosis of the intracranial internal carotid, anterior cerebral, or middle cerebral arteries. There is a 4 mm right MCA bifurcation aneurysm, superiorly and laterally directed. There is a 3 mm laterally, slightly inferiorly directed left MCA bifurcation aneurysm. No apparent blushing within the parenchymal hemorrhage to suggest active arterial extravasation. POSTERIOR CIRCULATION: No significant stenosis of the vertebral, basilar, or posterior cerebral arteries. No aneurysm. OTHER: No dural venous sinus thrombosis or high-grade stenosis. 1. No significant change compared to the earlier same day head CT with a large acute parenchymal hemorrhage centered within the left temporal lobe with localized mass effect, 6 mm rightward midline shift, small layering intraventricular hemorrhage along with adjacent left frontotemporal subdural hematoma. Stable caliber and configuration of the ventricles. 2. 4 mm right MCA bifurcation saccular aneurysm, as well as a 3 mm left MCA bifurcation saccular aneurysm. 3. No apparent blushing within the parenchymal hemorrhage to suggest active arterial extravasation. 4. Unremarkable CTV.      CT HEAD WO CONTRAST    Addendum Date: 11/26/2022    ADDENDUM: I reviewed the results with Dr. Chicho Lucero via telephone. Result Date: 11/26/2022  EXAMINATION: CT OF THE HEAD WITHOUT CONTRAST  11/26/2022 12:04 am TECHNIQUE: CT of the head was performed without the administration of intravenous contrast. Automated exposure control, iterative reconstruction, and/or weight based adjustment of the mA/kV was utilized to reduce the radiation dose to as low as reasonably achievable. COMPARISON: None. HISTORY: ORDERING SYSTEM PROVIDED HISTORY: ICH/SDH TECHNOLOGIST PROVIDED HISTORY: Has a \"code stroke\" or \"stroke alert\" been called? ->No Reason for exam:->ICH/SDH What reading provider will be dictating this exam?->CRC FINDINGS: BRAIN/VENTRICLES: 6.5 x 5.5 x 3.0 cm parenchymal hemorrhage centered on the superior left temporal lobe with small left frontal overlying subdural hematoma measuring up to 6.1 mm in thickness. Mass effect results in 8 mm of left to right midline shift at the foramen of Monro. Sulcal effacement over the left greater than right hemispheres. No loss of gray-white interface. ORBITS: The visualized portion of the orbits demonstrate no acute abnormality. SINUSES: The visualized paranasal sinuses and mastoid air cells demonstrate no acute abnormality. SOFT TISSUES/SKULL:  No acute abnormality of the visualized skull or soft tissues. 1. 6.5 X 5.5 x 3.0 cm parenchymal hemorrhage centered on the superior left temporal lobe with small left frontal overlying subdural hematoma measuring up to 6.1 mm in thickness. Mass effect results in 8 mm of left to right midline shift at the foramen of Monro. 2. No prior comparisons available on this database. The findings were sent to the Radiology Results [address] at 12:28 am on 11/26/2022 to put me in touch with a licensed caregiver to deliver the report. RECOMMENDATIONS: Careful clinical correlation and follow up recommended.      CT HEAD WO CONTRAST    Result Date: 11/25/2022                                      [facility] [address], [address] [place], [place] [address]                                              [phone]                                               CT Scan Report                                                  Signed    Sulma Dela Cruz [facility]                                                                MR#: [id number]  [id number]          : 1955                                                                [de-identified]            Age/Sex: 69 / F                                                                Admit Date: 22            Loc: ER                                                                                     Attending Dr:     Ordering Physician: Lexie Morales MD   Date of Service: 22  Procedure(s): CT head/brain wo con  Accession Number(s): [id number]    cc: Lexie Morales MD      HISTORY:  Unresponsive. TECHNIQUE:  Axial images through the head without contrast.       FINDINGS:  There is a 5 x 7 cm intraparenchymal hematoma in the left middle parietal lobe with   surrounding edema. Mass effect on the ventricles with effacement of the left lateral ventricle and   8 mm midline shift. Large acute subdural hematoma measuring 3-5 mm thickness extending along the left parietal lobe,   under the bilateral frontal lobes, and along the clivus abutting the midbrain and medulla. Sulci are prominent with low attenuation in the periventricular white matter suggesting mild edema. Impression   CT head with contrast:   1.  5 x 7 cm intraparenchymal hematoma in the left middle parietal lobe with surrounding edema. Mass effect on the ventricles with effacement of the left lateral ventricle and 8 mm midline shift. 2.  Large acute subdural hematoma measuring 3-5 mm thickness extending along the left parietal   lobe, under the bilateral frontal lobes, and along the clivus abutting the midbrain and medulla. Dictated ByJuan Koch MD   DD/DT: 22               Signed By: Sharon Mcelroy MD 22            : FAIZA    XR CHEST PORTABLE    Result Date: 2022  EXAMINATION: ONE XRAY VIEW OF THE CHEST 2022 2:41 am COMPARISON: None. HISTORY: ORDERING SYSTEM PROVIDED HISTORY: post-central line placement TECHNOLOGIST PROVIDED HISTORY: Reason for exam:->post-central line placement What reading provider will be dictating this exam?->CRC FINDINGS: Endotracheal tube tip well position 3.1 cm above the vijay. Gastric tube well position proximal side-hole below the hemidiaphragms. Esophageal probe noted in position. Right jugular central venous catheter tip at the cavoatrial junction. Lungs clear with no pneumothorax. Osseous thorax intact. Endotracheal tube, gastric tube, esophageal probe, and right jugular central venous catheter appear in good position. No pneumothorax or effusion.      XR CHEST PORTABLE    Result Date: 2022                                      [address]                                              [address]                                              [phone]                                                XRay Report                                                  Signed    [facility]                                                                MR[facility] [id number]  [id number]          : 1955                                                                [de-identified]            Age/Sex: 69 / F                                                                Admit Date: 22            Loc: ER                                                                                     Attending Dr:     Ordering Physician: Philippe Mcleod MD   Date of Service: 22  Procedure(s): XR chest 1V portable  Accession Number(s): [id number]    cc: Troy Clemens VENESSA CAR              PHYSICIAN INDICATIONS:  Cough       TECHNIQUE: Portable view. FINDINGS:       The heart size is normal.       The lung fields are clear. No infiltrates are identified. No pleural effusions are present. Endotracheal tube 2.5 cm above the vijay and nasogastric tube in stomach. IMPRESSION:       Chest, Portable:       1. No active pulmonary disease. 2.  Lines and tubes in place. No pneumothorax. Dictated ByJuan Koch MD   DD/DT: 11/25/22 1810               Signed By: Sharon Mcelroy MD 11/25/22 1810            : FAIZA    CTA HEAD W CONTRAST    Result Date: 11/26/2022  EXAMINATION: CTA OF THE HEAD WITH CONTRAST; CTV OF THE HEAD WITH CONTRAST; CT OF THE HEAD WITHOUT CONTRAST 11/26/2022 6:14 am TECHNIQUE: CTA of the head/brain was performed with the administration of intravenous contrast. Multiplanar reformatted images are provided for review. MIP images are provided for review. Automated exposure control, iterative reconstruction, and/or weight based adjustment of the mA/kV was utilized to reduce the radiation dose to as low as reasonably achievable.; CT venogram of the head/brain was performed with the administration of intravenous contrast. Multiplanar reformatted images are provided for review. MIP images are provided for review. Automated exposure control, iterative reconstruction, and/or weight based adjustment of the mA/kV was utilized to reduce the radiation dose to as low as reasonably achievable.; CT of the head was performed without the administration of intravenous contrast. Automated exposure control, iterative reconstruction, and/or weight based adjustment of the mA/kV was utilized to reduce the radiation dose to as low as reasonably achievable.  COMPARISON: Earlier same day head CT HISTORY: ORDERING SYSTEM PROVIDED HISTORY: left temporal IPH FINDINGS: CT HEAD: BRAIN/VENTRICLES:  Accounting for patient positioning and technique, there is no significant change in the large acute parenchymal hemorrhage centered within the left temporal lobe with similar localized mass effect including sulcal and fissural effacement, moderate to severe compression of the left lateral ventricle and 6 mm rightward midline shift. A small amount acute layering intraventricular hemorrhage again noted within the occipital horns. There is a similar adjacent 6 mm thickness subdural hematoma extending along the left frontotemporal convexity. No large territory infarct. Stable caliber and configuration of the ventricles. No michelle uncal herniation. ORBITS: The visualized portion of the orbits demonstrate no acute abnormality. SINUSES:  The visualized paranasal sinuses and mastoid air cells demonstrate no acute abnormality. SOFT TISSUES/SKULL: No acute abnormality of the visualized skull or soft tissues. CTA/CTV HEAD: ANTERIOR CIRCULATION: No significant stenosis of the intracranial internal carotid, anterior cerebral, or middle cerebral arteries. There is a 4 mm right MCA bifurcation aneurysm, superiorly and laterally directed. There is a 3 mm laterally, slightly inferiorly directed left MCA bifurcation aneurysm. No apparent blushing within the parenchymal hemorrhage to suggest active arterial extravasation. POSTERIOR CIRCULATION: No significant stenosis of the vertebral, basilar, or posterior cerebral arteries. No aneurysm. OTHER: No dural venous sinus thrombosis or high-grade stenosis. 1. No significant change compared to the earlier same day head CT with a large acute parenchymal hemorrhage centered within the left temporal lobe with localized mass effect, 6 mm rightward midline shift, small layering intraventricular hemorrhage along with adjacent left frontotemporal subdural hematoma. Stable caliber and configuration of the ventricles. 2. 4 mm right MCA bifurcation saccular aneurysm, as well as a 3 mm left MCA bifurcation saccular aneurysm.  3. No apparent blushing within the parenchymal hemorrhage to suggest active arterial extravasation. 4. Unremarkable CTV. CTA VENOUS HEAD W WO CONTRAST    Result Date: 11/26/2022  EXAMINATION: CTA OF THE HEAD WITH CONTRAST; CTV OF THE HEAD WITH CONTRAST; CT OF THE HEAD WITHOUT CONTRAST 11/26/2022 6:14 am TECHNIQUE: CTA of the head/brain was performed with the administration of intravenous contrast. Multiplanar reformatted images are provided for review. MIP images are provided for review. Automated exposure control, iterative reconstruction, and/or weight based adjustment of the mA/kV was utilized to reduce the radiation dose to as low as reasonably achievable.; CT venogram of the head/brain was performed with the administration of intravenous contrast. Multiplanar reformatted images are provided for review. MIP images are provided for review. Automated exposure control, iterative reconstruction, and/or weight based adjustment of the mA/kV was utilized to reduce the radiation dose to as low as reasonably achievable.; CT of the head was performed without the administration of intravenous contrast. Automated exposure control, iterative reconstruction, and/or weight based adjustment of the mA/kV was utilized to reduce the radiation dose to as low as reasonably achievable. COMPARISON: Earlier same day head CT HISTORY: ORDERING SYSTEM PROVIDED HISTORY: left temporal IPH FINDINGS: CT HEAD: BRAIN/VENTRICLES:  Accounting for patient positioning and technique, there is no significant change in the large acute parenchymal hemorrhage centered within the left temporal lobe with similar localized mass effect including sulcal and fissural effacement, moderate to severe compression of the left lateral ventricle and 6 mm rightward midline shift. A small amount acute layering intraventricular hemorrhage again noted within the occipital horns.  There is a similar adjacent 6 mm thickness subdural hematoma extending along the left frontotemporal convexity. No large territory infarct. Stable caliber and configuration of the ventricles. No michelle uncal herniation. ORBITS: The visualized portion of the orbits demonstrate no acute abnormality. SINUSES:  The visualized paranasal sinuses and mastoid air cells demonstrate no acute abnormality. SOFT TISSUES/SKULL: No acute abnormality of the visualized skull or soft tissues. CTA/CTV HEAD: ANTERIOR CIRCULATION: No significant stenosis of the intracranial internal carotid, anterior cerebral, or middle cerebral arteries. There is a 4 mm right MCA bifurcation aneurysm, superiorly and laterally directed. There is a 3 mm laterally, slightly inferiorly directed left MCA bifurcation aneurysm. No apparent blushing within the parenchymal hemorrhage to suggest active arterial extravasation. POSTERIOR CIRCULATION: No significant stenosis of the vertebral, basilar, or posterior cerebral arteries. No aneurysm. OTHER: No dural venous sinus thrombosis or high-grade stenosis. 1. No significant change compared to the earlier same day head CT with a large acute parenchymal hemorrhage centered within the left temporal lobe with localized mass effect, 6 mm rightward midline shift, small layering intraventricular hemorrhage along with adjacent left frontotemporal subdural hematoma. Stable caliber and configuration of the ventricles. 2. 4 mm right MCA bifurcation saccular aneurysm, as well as a 3 mm left MCA bifurcation saccular aneurysm. 3. No apparent blushing within the parenchymal hemorrhage to suggest active arterial extravasation. 4. Unremarkable CTV.      CBC:   Lab Results   Component Value Date/Time    WBC 14.9 11/26/2022 04:50 AM    RBC 4.73 11/26/2022 04:50 AM    HGB 10.7 11/26/2022 04:50 AM    HCT 32.4 11/26/2022 04:50 AM    MCV 68.5 11/26/2022 04:50 AM    MCH 22.6 11/26/2022 04:50 AM    MCHC 33.0 11/26/2022 04:50 AM    RDW 22.3 11/26/2022 04:50 AM     11/26/2022 04:50 AM    MPV 8.6 11/26/2022 04:50 AM     BMP:    Lab Results   Component Value Date/Time     11/26/2022 10:00 AM    K 3.3 11/26/2022 04:50 AM     11/26/2022 04:50 AM    CO2 18 11/26/2022 04:50 AM    BUN 13 11/26/2022 04:50 AM    LABALBU 3.8 11/26/2022 04:50 AM    CREATININE 0.5 11/26/2022 04:50 AM    CALCIUM 8.4 11/26/2022 04:50 AM    LABGLOM >60 11/26/2022 04:50 AM    GLUCOSE 188 11/26/2022 04:50 AM       Resting comfortably no apparent distress son at the bedside counseled him extensively answered all his questions  Assessment:  Patient Active Problem List   Diagnosis    Intraparenchymal hematoma of brain, unspecified laterality, with loc of unspecified duration, sequela (HCC)    Intracranial hemorrhage ([facility] [address]     Plan: Comfort care only   Kilo Joel MD M.D.

## 2022-11-28 NOTE — PLAN OF CARE
Problem: Skin/Tissue Integrity  Goal: Absence of new skin breakdown  Description: 1. Monitor for areas of redness and/or skin breakdown  2. Assess vascular access sites hourly  3. Every 4-6 hours minimum:  Change oxygen saturation probe site  4. Every 4-6 hours:  If on nasal continuous positive airway pressure, respiratory therapy assess nares and determine need for appliance change or resting period.   Outcome: Progressing     Problem: Safety - Adult  Goal: Free from fall injury  Outcome: Progressing  Flowsheets (Taken 11/27/2022 2141)  Free From Fall Injury: Instruct family/caregiver on patient safety     Problem: Pain  Goal: Verbalizes/displays adequate comfort level or baseline comfort level  Outcome: Progressing  Flowsheets (Taken 11/27/2022 2141)  Verbalizes/displays adequate comfort level or baseline comfort level:   Administer analgesics based on type and severity of pain and evaluate response   Consider cultural and social influences on pain and pain management   Assess pain using appropriate pain scale   Implement non-pharmacological measures as appropriate and evaluate response   Notify Licensed Independent Practitioner if interventions unsuccessful or patient reports new pain

## 2022-11-28 NOTE — PROGRESS NOTES
Physician Progress Note      Rafat Perez  CSN #:                  [id number]  :                       1955  ADMIT DATE:       2022 8:30 PM  [address] [place] DATE:    PROVIDER #:        Alok Anguiano MD          QUERY TEXT:    Dear Dr. Jeana Chavez,    Pt admitted with SDH. Pt noted to have mass effect and documented cerebral   edema. If clinically significant, please document in progress notes and   discharge summary if you are evaluating/treating any of the following: The medical record reflects the following:  Risk Factors: SDH  Clinical Indicators:  CT head: \"6.5 X 5.5 x 3.0 cm parenchymal hemorrhage   centered on the superior left  temporal lobe with small left frontal overlying subdural hematoma measuring up   to 6.1 mm in thickness. Mass effect results in 8 mm of left to right midline   shift at the foramen of Monro\"  Treatment: Serial imaging, close pt monitoring    Thank you,  Savi MASON, RN  Clinical Documentation Improvement  [email or web address]  Options provided:  -- Cerebral edema and Brain compression  -- No brain compression cerebral edema only  -- Other - I will add my own diagnosis  -- Disagree - Not applicable / Not valid  -- Disagree - Clinically unable to determine / Unknown  -- Refer to Clinical Documentation Reviewer    PROVIDER RESPONSE TEXT:    This patient has cerebral edema and brain compression.     Query created by: Paul Boss on 2022 10:32 AM      Electronically signed by:  Alok Anguiano MD 2022 12:31 PM

## 2022-11-29 NOTE — PROGRESS NOTES
Pt seen at bedside along with son Susy Reed and daughter in law [place]. Pt is a dnrcc, family expresses understanding of pts continued decline. We discussed benefits of hospice care, son is hesitant to have pt moved.  Will review with Palliative Care NP.

## 2022-11-29 NOTE — ACP (ADVANCE CARE PLANNING)
Advance Care Planning   Healthcare Decision Maker:    Primary Decision Maker: Curtis Lopez - [phone]    Secondary Decision Maker: Inder Mon Lien Daughter-in-Law - [phone]    Click here to complete Healthcare Decision Makers including selection of the Healthcare Decision Maker Relationship (ie \"Primary\").

## 2022-11-29 NOTE — PROGRESS NOTES
Palliative Care Department  [phone]  Palliative Care Progress Note  Provider Tabitha Ann, OSCAR - STEPHANY    Mony Babcock  [id number]  Hospital Day: 5  Date of Initial Consult: 11/25/2022  Referring Provider: Perry Umana MD  Palliative Medicine was consulted for assistance with: Goals of care    HPI:   Mony Babcock is a 69 y.o. with a medical history of HTN and GERD who was admitted on 11/25/2022 from [facility] ED with a CHIEF COMPLAINT of AMS. Patient was found down in her year by her neighbors. Patient was seen about a week ago for complaints of headache. CT revealed  5x7 intraparenchymal hematoma in left middle parietal lobe with edema 8mm  midline shift  and large subdural hematoma 3-5 m in thickness. Patient was admitted to SICU for further medical management and palliative medicine was consulted for goals of care. ASSESSMENT/PLAN:     Pertinent Hospital Diagnoses     ICH/SDH    Palliative Care Encounter / Counseling Regarding Goals of Care  Please see detailed goals of care discussion as below  At this time, Mony Babcock, Does Not have capacity for medical decision-making.   Capacity is time limited and situation/question specific  During encounter Curtis was surrogate medical decision-maker  Outcome of goals of care meeting:   Comfort measures   Discuss hospice choices  Code status DNR-CC  Advanced Directives: no POA or living will in epic  Surrogate/Legal NOK:  Lani Vargas (child) [phone]    Spiritual assessment: no spiritual distress identified  Bereavement and grief: to be determined  Referrals to: none today  SUBJECTIVE:     Current medical issues leading to Palliative Medicine involvement include   Active Hospital Problems    Diagnosis Date Noted    Intracranial hemorrhage ([address] [I62.9] 11/26/2022     Priority: Medium    Intraparenchymal hematoma of brain, unspecified laterality, with loc of unspecified duration, sequela ([address] [Z65.919S] 11/25/2022     Priority: Medium       Details of Conversation:    Chart reviewed. Discussed case with bedside nurse, , and palliative . Patient seen in bed, obtunded and any periods of apnea. Patient's son, Martinez Marks, and daughter-in-law present at bedside. Patient extubated on 11/26/2022, patient transferred to med/surg floor on a morphine drip, Ativan and Robinul as needed. Patient is very symptomatic. Introduced self. Martinez Marks would like to continue comfort measures within the hospital.  Discussed transfer to nursing home or potentially hospice house as patient is very symptomatic. Son has been very resistant for patient to transfer to a different facility. Martinez Marks states he does not feel it is necessary to transfer the patient as he feels the patient will pass soon within the hospital setting. He was also concerned about moving her somewhere that does not have the ability to continue IV morphine drip. Curtis originally wanted patient transferred to [facility] hospice facility although he does not feel the patient will be able to make the trip. Discussed hospice at length and consulting them for information purposes. Discussed with her that following up tomorrow to discuss potential hospice choices and options for the patient if she is still with us. Emotional support provided. Palliative medicine to follow.       OBJECTIVE:   Prognosis: hours-to-days    Physical Exam:  /63   Pulse (!) 114   Temp 100.2 °F (37.9 °C) (Temporal)   Resp 22   Ht 5' 3\" (1.6 m)   Wt 135 lb 12.8 oz (61.6 kg)   SpO2 (!) 69%   BMI 24.06 kg/m²   Constitutional:  obtunded   Lungs: Diminished  Heart: ST  Abd:  Soft, non tender, non distended, bowel sounds present  Skin:  Warm and dry, no rashes on visible skin  Neuro: Obtunded, unable to follow commands    Objective data reviewed: labs, images, records, medication use, vitals, and chart    Discussed patient and the plan of care with the other IDT members: Palliative Medicine IDT Team, Primary Team, Floor Nurse, and Family    Time/Communication  Greater than 50% of time spent, total 25 minutes in counseling and coordination of care at the bedside regarding goals of care and diagnosis and prognosis. Thank you for allowing Palliative Medicine to participate in the care of Nilda Gibson.

## 2022-11-29 NOTE — PLAN OF CARE
Problem: Skin/Tissue Integrity  Goal: Absence of new skin breakdown  Description: 1. Monitor for areas of redness and/or skin breakdown  2. Assess vascular access sites hourly  3. Every 4-6 hours minimum:  Change oxygen saturation probe site  4. Every 4-6 hours:  If on nasal continuous positive airway pressure, respiratory therapy assess nares and determine need for appliance change or resting period.   Outcome: Progressing     Problem: Safety - Adult  Goal: Free from fall injury  Outcome: Progressing     Problem: Pain  Goal: Verbalizes/displays adequate comfort level or baseline comfort level  Outcome: Progressing  Flowsheets (Taken 11/28/2022 2000)  Verbalizes/displays adequate comfort level or baseline comfort level:   Implement non-pharmacological measures as appropriate and evaluate response   Assess pain using appropriate pain scale   Administer analgesics based on type and severity of pain and evaluate response     Problem: Discharge Planning  Goal: Discharge to home or other facility with appropriate resources  Recent Flowsheet Documentation  Taken 11/28/2022 2000 by Renan Chapa RN  Discharge to home or other facility with appropriate resources: Arrange for needed discharge resources and transportation as appropriate     Problem: Pain  Goal: Verbalizes/displays adequate comfort level or baseline comfort level  Recent Flowsheet Documentation  Taken 11/28/2022 2000 by Renan Chapa RN  Verbalizes/displays adequate comfort level or baseline comfort level:   Implement non-pharmacological measures as appropriate and evaluate response   Assess pain using appropriate pain scale   Administer analgesics based on type and severity of pain and evaluate response

## 2022-11-29 NOTE — PROGRESS NOTES
Ruddy Chavez called and updated on patients transfer to . Report was also called to the RN, and transport was put in.

## 2022-11-29 NOTE — CARE COORDINATION
11/29/22 Update CM Note; Patient transferred to general medical floor today. She is a [address]. She is being followed by [facility] but son is reluctant to allow patient to transport to their hospice house due to length of ride. He is fearful she will pass in the ambulance. She remains on iv MS drip. Palliative care has had lengthy discussion with son regarding a discharge plan due to poor prognosis. He was offered the Hospice house locally and change to hospice [facility] and he is still unsure of his wants. Patient meets criteria for the hospice house at either facility. He wants to revisit hospice options in the morning of Wednesday. Palliative care continues to follow.  Electronically signed by Rick Santos RN CM on 11/29/2022 at 12:32 PM

## 2022-11-29 NOTE — PROGRESS NOTES
Hospitalist Progress Note      SYNOPSIS: Patient admitted on 2022 (transferred from [facility])  69 y.o. female with past medical history of hypertension   With intraparenchymal hemorrhage left middle parietal lobe  SUBJECTIVE:    Intraparenchymal hemorrhage/5x7 intraparenchymal hematoma in left middle parietal lobe and  subdural hematoma       Comfortable in bed. Temp (24hrs), Av.6 °F (38.1 °C), Min:100.2 °F (37.9 °C), Max:101 °F (38.3 °C)    DIET: Diet NPO  CODE: DNR-CC  OBJECTIVE:    /63   Pulse (!) 114   Temp 100.2 °F (37.9 °C) (Temporal)   Resp 22   Ht 5' 3\" (1.6 m)   Wt 135 lb 12.8 oz (61.6 kg)   SpO2 (!) 69%   BMI 24.06 kg/m²     General appearance: Elderly  resting comfortably, lethargic  HEENT= no obv swelling to lips/mouth region  Respiratory: unlabored respirat status  no tachypnea   Derm= no hives or petechiae visible   Abdomen soft nontender    ASSESSMENT:      Intraparenchymal hemorrhage/5x7 intraparenchymal hematoma in left middle parietal lobe and  subdural hematoma   Intubated, continue supportive care. Foot care  Hypokalemia-likely due to decreased oral intake  Low C02 -on serum chemistry  Mild leukocytosis  Anemia microcytic-etiology unclear as microcytosis present prior to admission      With normal hemoglobin  Mild hyperglycemia-possibly secondary to inflammatory cytokines  Mild alk phos elevation-etiology unclear as yesterday this value was normal range         Clinical significance of this unclear    Currently on ventilator   Chest x-ray does not reveal clear evidence of infiltrate       Hypertension essential by history  History of PMR was documented on intake H&P from this facility-diagnosis not confirmed  History of spinal stenosis-/with neurogenic claudication-documented        Earlier this month  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   Possible discharge to hospice house.   appreciate input from case management/. DISPOSITION:       Medications:  REVIEWED DAILY    Infusion Medications    morphine 8 mg/hr (11/29/22 0238)     Scheduled Medications       PRN Meds: sodium chloride flush, morphine **OR** morphine, LORazepam, glycopyrrolate, midazolam, ondansetron **OR** ondansetron    Labs:     No results for input(s): WBC, HGB, HCT, PLT in the last 72 hours. No results for input(s): NA, K, CL, CO2, BUN, CREATININE, CALCIUM, PHOS in the last 72 hours. Invalid input(s): MAGNES      No results for input(s): PROT, ALB, ALKPHOS, ALT, AST, BILITOT, AMYLASE, LIPASE in the last 72 hours. No results for input(s): INR in the last 72 hours. No results for input(s): Anjali Rowels in the last 72 hours. Chronic labs:    Lab Results   Component Value Date    CHOL 289 (H) 07/23/2021    TRIG 88 07/23/2021    HDL 75 07/23/2021    TSH 4.67 07/23/2021    INR 1.07 11/25/2022    LABA1C 4.8 11/26/2022       Radiology: REVIEWED DAILY    +++++++++++++++++++++++++++++++++++++++++++++++++  Lina Gustafson MD  [facility] Physician - [address] [place] [address]  +++++++++++++++++++++++++++++++++++++++++++++++++  NOTE: This report was transcribed using voice recognition software. Every effort was made to ensure accuracy; however, inadvertent computerized transcription errors may be present.

## 2022-11-30 NOTE — DISCHARGE SUMMARY
Hospitalist Discharge Summary    Patient ID: Subha Marie   Patient : 1955  Patient's PCP: No primary care provider on file. Admit Date: 2022   Admitting Physician: Colonel Darvin MD    Discharge Date:  2022  Discharge Physician: Colonel Darvin MD   Discharge Condition:    Discharge Disposition:     History of presenting illness:    69 y.o. female with past medical history of hypertension GERD PMR  . Patient is a transfer from [place] . Patient is intubated and sedated . She was found down in her yard by her neighbors . It is unclear how long she was down for . Salbador Bullard Patient was seen 5-6 days ago due to headache. and CT head and CT angio were negative Son states that he last spoke to patient at 1300 and she was well . CT revealed  5x7 intraparenchymal hematoma in left middle parietal lobe with edema 8mm  midline shift  and large subdural hematoma 3-5 m in thickness . Patient was intubated and pressures were noted to be elevated and she was placed on nicardipine drip . Patient is not on anticoagulation  ECG revealed sinus bradycardia . CBC unremarkable  CMP revealed sodium 133  BUN 23 SCr  0.7 glucose 153  VBG revealed respiratory alkalosis  CXR was negative . Patient was given Keppra as well,. Patient was transferred to [address] [place] [address] for further evaluation       Hospital course in brief:  (Please refer to daily progress notes for a comprehensive review of the hospitalization by requesting medical records)    Was admitted to the hospital with intracranial hemorrhage. She was initially admitted to the ICU and was intubated. Surgery consulted and managed. Patient was extubated and placed on comfort care.   She subsequently  2022    Consults:   IP CONSULT TO NEUROSURGERY  IP CONSULT TO PHARMACY  PHARMACY TO CHANGE BASE FLUIDS  IP CONSULT TO CRITICAL CARE  IP CONSULT TO PALLIATIVE CARE  IP CONSULT TO NEUROLOGY  IP CONSULT TO DIETITIAN    Discharge Diagnoses:    Intraparenchymal hemorrhage/5x7 intraparenchymal hematoma in left middle parietal lobe and  subdural hematoma     Hypokalemia-  Mild leukocytosis  Anemia microcytic-etiology unclear as microcytosis present prior to admission  Mild hyperglycemia-possibly secondary to inflammatory cytokines  Mild alk phos elevation-etiology unclear as yesterday this value was normal range  Hypertension essential   Incidental saccular aneurysms     +++++++++++++++++++++++++++++++++++++++++++++++++  Bryanna Mosley MD  [address]  +++++++++++++++++++++++++++++++++++++++++++++++++  NOTE: This report was transcribed using voice recognition software. Every effort was made to ensure accuracy; however, inadvertent computerized transcription errors may be present.

## 2022-11-30 NOTE — PROGRESS NOTES
Son Frances Grant notified of pt . Frances Grant can be contacted at [phone]. Physician, supervisor and [employer] notified. Pt organ, tissue and eye donor.  services to be at [facility]. All documentation completed.

## 2022-12-10 NOTE — PROGRESS NOTES
Physician Progress Note      Corrie Wan  CSN #:                  [id number]  :                       1955  ADMIT DATE:       2022 8:30 PM  Devin Huffman DATE:        2022 8:08 AM  RESPONDING  PROVIDER #:        Sunitha Tilley DO          QUERY TEXT:    Dear Dr. Kaiser Celeste,    Pt admitted with Intraparenchymal hematoma and has respiratory failure   documented. If possible, please document in progress notes and discharge   summary further specificity regarding the type and acuity of respiratory   failure: The medical record reflects the following:  Risk Factors: Cerebral hematoma with cerebral edema  Clinical Indicators: Pt \"found down\" by neighbors unknown amount of down time,   CT  head: \"6.5 X 5.5 x 3.0 cm parenchymal hemorrhage. ..left frontal overlying   subdural hematoma measuring  up to 6.1 mm in thickness. Mass effect results in 8 mm of left to right   midline shift at the foramen of Monro. \", Pt transferred to [facility] already   intubated and sedated  Treatment: Close pt monitoring, ABG's    Thank you,  Arti MASON, RN  Clinical Documentation Improvement  [email or web address]  Options provided:  -- Acute respiratory failure with hypoxia  -- Acute respiratory failure with hypercapnia  -- Acute respiratory failure with hypoxia and hypercapnia  -- No respiratory failure intubated for airway protection only  -- Other - I will add my own diagnosis  -- Disagree - Not applicable / Not valid  -- Disagree - Clinically unable to determine / Unknown  -- Refer to Clinical Documentation Reviewer    PROVIDER RESPONSE TEXT:    Provider is clinically unable to determine a response to this query. i assume patient was intubated to protect airway ?     Query created by: Cleo Edmond on 2022 10:19 AM      Electronically signed by:  Sunitha Tilley DO 12/10/2022 9:21 AM